# Patient Record
Sex: MALE | Race: WHITE | NOT HISPANIC OR LATINO | Employment: FULL TIME | URBAN - METROPOLITAN AREA
[De-identification: names, ages, dates, MRNs, and addresses within clinical notes are randomized per-mention and may not be internally consistent; named-entity substitution may affect disease eponyms.]

---

## 2017-02-22 RX ORDER — SILDENAFIL CITRATE 100 MG/1
TABLET, FILM COATED ORAL
Qty: 10 TABLET | Refills: 0 | Status: SHIPPED | OUTPATIENT
Start: 2017-02-22 | End: 2017-05-02 | Stop reason: SDUPTHER

## 2017-05-02 RX ORDER — SILDENAFIL CITRATE 100 MG/1
TABLET, FILM COATED ORAL
Qty: 4 TABLET | Refills: 0 | Status: SHIPPED | OUTPATIENT
Start: 2017-05-02 | End: 2017-06-12 | Stop reason: SDUPTHER

## 2017-06-12 DIAGNOSIS — F98.8 ADD (ATTENTION DEFICIT DISORDER): ICD-10-CM

## 2017-06-12 RX ORDER — SILDENAFIL CITRATE 100 MG/1
TABLET, FILM COATED ORAL
Qty: 4 TABLET | Refills: 0 | Status: SHIPPED | OUTPATIENT
Start: 2017-06-12 | End: 2017-09-10 | Stop reason: SDUPTHER

## 2017-06-13 RX ORDER — DEXMETHYLPHENIDATE HYDROCHLORIDE 10 MG/1
10 TABLET ORAL 3 TIMES DAILY
Qty: 90 TABLET | Refills: 0 | OUTPATIENT
Start: 2017-06-13 | End: 2017-07-13

## 2017-06-13 NOTE — TELEPHONE ENCOUNTER
----- Message from Lisa Crawford sent at 6/13/2017  1:33 PM CDT -----  Contact: self  Pt needs a refill on dexmethylphenidate (FOCALIN) 10 MG tablet.  He uses [facility] on [address].      Pt can be reached at [phone]

## 2017-09-11 RX ORDER — SILDENAFIL CITRATE 100 MG/1
TABLET, FILM COATED ORAL
Qty: 10 TABLET | Refills: 0 | Status: SHIPPED | OUTPATIENT
Start: 2017-09-11 | End: 2017-12-09 | Stop reason: SDUPTHER

## 2017-10-04 DIAGNOSIS — F98.8 ATTENTION DEFICIT DISORDER, UNSPECIFIED HYPERACTIVITY PRESENCE: ICD-10-CM

## 2017-10-04 RX ORDER — DEXMETHYLPHENIDATE HYDROCHLORIDE 10 MG/1
10 TABLET ORAL 3 TIMES DAILY
Qty: 90 TABLET | Refills: 0 | OUTPATIENT
Start: 2017-10-04 | End: 2017-11-03

## 2017-10-04 NOTE — TELEPHONE ENCOUNTER
----- Message from Bev Astorga sent at 10/4/2017 11:59 AM CDT -----  Contact: Tana Casper (Spouse)  Tana would like to speak with a nurse regarding a sooner appt that has been offered to her  via text.  Please contact Tana back as soon as possible with appt date and time that is being offered.    Tana can be reached back at [phone]    Thank you

## 2017-10-17 ENCOUNTER — OFFICE VISIT (OUTPATIENT)
Dept: INTERNAL MEDICINE | Facility: CLINIC | Age: 42
End: 2017-10-17
Payer: COMMERCIAL

## 2017-10-17 VITALS
DIASTOLIC BLOOD PRESSURE: 88 MMHG | TEMPERATURE: 99 F | SYSTOLIC BLOOD PRESSURE: 142 MMHG | HEART RATE: 86 BPM | WEIGHT: 221.13 LBS | BODY MASS INDEX: 32.75 KG/M2 | HEIGHT: 69 IN | RESPIRATION RATE: 17 BRPM

## 2017-10-17 DIAGNOSIS — F98.8 ATTENTION DEFICIT DISORDER, UNSPECIFIED HYPERACTIVITY PRESENCE: Primary | ICD-10-CM

## 2017-10-17 DIAGNOSIS — R03.0 ELEVATED BLOOD PRESSURE READING: ICD-10-CM

## 2017-10-17 LAB
AMPHET+METHAMPHET UR QL: NEGATIVE
BARBITURATES UR QL SCN>200 NG/ML: NEGATIVE
BENZODIAZ UR QL SCN>200 NG/ML: NORMAL
BZE UR QL SCN: NEGATIVE
CANNABINOIDS UR QL SCN: NEGATIVE
CREAT UR-MCNC: 139 MG/DL
ETHANOL UR-MCNC: <10 MG/DL
METHADONE UR QL SCN>300 NG/ML: NEGATIVE
OPIATES UR QL SCN: NORMAL
PCP UR QL SCN>25 NG/ML: NEGATIVE
TOXICOLOGY INFORMATION: NORMAL

## 2017-10-17 PROCEDURE — 99999 PR PBB SHADOW E&M-EST. PATIENT-LVL III: CPT | Mod: PBBFAC,,, | Performed by: INTERNAL MEDICINE

## 2017-10-17 PROCEDURE — 80307 DRUG TEST PRSMV CHEM ANLYZR: CPT

## 2017-10-17 PROCEDURE — 99213 OFFICE O/P EST LOW 20 MIN: CPT | Mod: S$GLB,,, | Performed by: INTERNAL MEDICINE

## 2017-10-17 RX ORDER — HYDROCODONE BITARTRATE AND ACETAMINOPHEN 10; 325 MG/1; MG/1
1 TABLET ORAL DAILY PRN
COMMUNITY
Start: 2017-10-10 | End: 2022-10-27 | Stop reason: CLARIF

## 2017-10-17 RX ORDER — DEXMETHYLPHENIDATE HYDROCHLORIDE 10 MG/1
10 TABLET ORAL 2 TIMES DAILY
Qty: 60 TABLET | Refills: 0 | Status: SHIPPED | OUTPATIENT
Start: 2017-11-17 | End: 2017-12-19 | Stop reason: SDUPTHER

## 2017-10-17 RX ORDER — DEXMETHYLPHENIDATE HYDROCHLORIDE 10 MG/1
10 TABLET ORAL 2 TIMES DAILY
Qty: 60 TABLET | Refills: 0 | Status: SHIPPED | OUTPATIENT
Start: 2017-10-17 | End: 2017-11-16

## 2017-10-17 NOTE — PROGRESS NOTES
"Subjective:       Patient ID: Ernesto Casper is a 42 y.o. male.    Chief Complaint: Medication Refill (focalin refill)    HPI   ADD - previously seen by psychiatrist. Notes were sent to Dr. Diggs previously. Dr. Diggs retired. Notes not in system but I had spoken w/ Dr. Diggs previously and confirmed that he was on focolin supposed to be on 10mg TID. Previously controlled on focalin.   Drinks about 3 five hour energy a day w/o focalin.     Lost insurance so lost to follow up for a yr.   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 Finds himself redoing the same tasks so things are taking twice as long to finish and also has to recheck himself regularly.     Recently hurt his back - was on norco prn (rare). Follows w/ outside physician. Plan for MRI. Reports has chronic L4-L5 issues. H/o JACKIE in the past.   Has slight pain still.   Some anxiety at work.   No palpitations/SOB/CP.     Has been exercising regularly. Has lost weight intentionally - went from 38 in waist to 32.   Rare alcohol use.     Review of Systems  as above in HPI.     Objective:      Physical Exam    BP (!) 142/88   Pulse 86   Temp 98.6 °F (37 °C) (Oral)   Resp 17   Ht 5' 9" (1.753 m)   Wt 100.3 kg (221 lb 1.9 oz)   BMI 32.65 kg/m²     GEN - A+OX4, NAD   HEENT - PERRL, EOMI, OP clear. MMM.   Neck - No thyromegaly or cervical LAD. No thyroid masses felt.  CV - RRR, no m/r   Chest - CTAB, no wheezing or rhonchi  Abd - S/NT/ND/+BS.   Ext - 2+B radial pulses. No LE edema.   Neuro - normal gait. 2+ B dtrs.  Skin - No rash.      Assessment/Plan     Ernesto was seen today for medication refill.    Diagnoses and all orders for this visit:    Attention deficit disorder, unspecified hyperactivity presence  -     dexmethylphenidate (FOCALIN) 10 MG tablet; Take 1 tablet (10 mg total) by mouth 2 (two) times daily.  -     dexmethylphenidate (FOCALIN) 10 MG tablet; Take 1 tablet (10 mg total) by mouth 2 " (two) times daily.  -     TOXICOLOGY SCREEN, URINE, RANDOM (COMPLIANCE)    Elevated blood pressure reading  Pt to monitor BP at home daily. Will restart focalin 10mg BID first. Reassess BP at the next visit.     Return in about 2 months (around 12/17/2017).      Chely Tran MD  Department of Internal Medicine - [facility]  4:00 PM

## 2017-12-11 RX ORDER — SILDENAFIL CITRATE 100 MG/1
TABLET, FILM COATED ORAL
Qty: 10 TABLET | Refills: 0 | Status: SHIPPED | OUTPATIENT
Start: 2017-12-11 | End: 2017-12-19

## 2017-12-19 ENCOUNTER — OFFICE VISIT (OUTPATIENT)
Dept: INTERNAL MEDICINE | Facility: CLINIC | Age: 42
End: 2017-12-19
Payer: COMMERCIAL

## 2017-12-19 ENCOUNTER — PATIENT MESSAGE (OUTPATIENT)
Dept: INTERNAL MEDICINE | Facility: CLINIC | Age: 42
End: 2017-12-19

## 2017-12-19 VITALS
BODY MASS INDEX: 32.26 KG/M2 | WEIGHT: 217.81 LBS | DIASTOLIC BLOOD PRESSURE: 82 MMHG | HEART RATE: 76 BPM | SYSTOLIC BLOOD PRESSURE: 138 MMHG | TEMPERATURE: 98 F | RESPIRATION RATE: 16 BRPM | HEIGHT: 69 IN

## 2017-12-19 DIAGNOSIS — F98.8 ATTENTION DEFICIT DISORDER, UNSPECIFIED HYPERACTIVITY PRESENCE: Primary | ICD-10-CM

## 2017-12-19 DIAGNOSIS — N52.9 ERECTILE DYSFUNCTION, UNSPECIFIED ERECTILE DYSFUNCTION TYPE: ICD-10-CM

## 2017-12-19 DIAGNOSIS — F98.8 ATTENTION DEFICIT DISORDER, UNSPECIFIED HYPERACTIVITY PRESENCE: ICD-10-CM

## 2017-12-19 PROCEDURE — 99213 OFFICE O/P EST LOW 20 MIN: CPT | Mod: S$GLB,,, | Performed by: INTERNAL MEDICINE

## 2017-12-19 PROCEDURE — 99999 PR PBB SHADOW E&M-EST. PATIENT-LVL III: CPT | Mod: PBBFAC,,, | Performed by: INTERNAL MEDICINE

## 2017-12-19 RX ORDER — DEXMETHYLPHENIDATE HYDROCHLORIDE 10 MG/1
10 TABLET ORAL 2 TIMES DAILY
Qty: 60 TABLET | Refills: 0 | Status: CANCELLED | OUTPATIENT
Start: 2017-12-19 | End: 2018-01-18

## 2017-12-19 RX ORDER — DEXMETHYLPHENIDATE HYDROCHLORIDE 10 MG/1
10 TABLET ORAL 3 TIMES DAILY
Qty: 90 TABLET | Refills: 0 | Status: SHIPPED | OUTPATIENT
Start: 2018-01-19 | End: 2018-02-18

## 2017-12-19 RX ORDER — DEXMETHYLPHENIDATE HYDROCHLORIDE 10 MG/1
10 TABLET ORAL 3 TIMES DAILY
Qty: 90 TABLET | Refills: 0 | Status: SHIPPED | OUTPATIENT
Start: 2017-12-19 | End: 2018-01-18

## 2017-12-19 RX ORDER — SILDENAFIL 100 MG/1
100 TABLET, FILM COATED ORAL DAILY PRN
Qty: 10 TABLET | Refills: 3 | Status: SHIPPED | OUTPATIENT
Start: 2017-12-19 | End: 2018-07-10 | Stop reason: SDUPTHER

## 2017-12-19 RX ORDER — DEXMETHYLPHENIDATE HYDROCHLORIDE 10 MG/1
10 TABLET ORAL 3 TIMES DAILY
Qty: 90 TABLET | Refills: 0 | Status: SHIPPED | OUTPATIENT
Start: 2018-02-19 | End: 2018-03-21 | Stop reason: SDUPTHER

## 2017-12-19 NOTE — PROGRESS NOTES
"Subjective:       Patient ID: Ernesto Casper is a 42 y.o. male.    Chief Complaint: ADD f/u    HPI   ADD - previously seen by psychiatrist. Notes were sent to Dr. Diggs previously. Dr. Diggs retired. Notes not in system but I had spoken w/ Dr. Diggs previously and confirmed that he was on focolin supposed to be on 10mg TID. Previously controlled on focalin.   Drinks about 3 five hour energy a day w/o focalin.   At last visit 10/17/17, restarted on focalin 10mg BID instead of TID as he was off focalin for a while. Reports that this is an improvement but med runs out towards the end of the day - day starts at 6am and ends at 8pm at night so long days.    Has a BP monitor at home. Variable at home. SBP<140. Increased cardio.   Weight loss of about 4lbs. Works out regularly. Low body fat. Chronic insomnia. Not worse w/ focalin.     ED - viagra. Needs generic.     Review of Systems  Comprehensive review of systems otherwise negative. See history/subjective section for more details.    Objective:      Physical Exam    /82   Pulse 76   Temp 97.9 °F (36.6 °C) (Oral)   Resp 16   Ht 5' 9" (1.753 m)   Wt 98.8 kg (217 lb 13 oz)   BMI 32.17 kg/m²     GEN - A+OX4, NAD   HEENT - PERRL, EOMI, OP clear. MMM.   Neck - No thyromegaly or cervical LAD. No thyroid masses felt.  CV - RRR, no m/r   Chest - CTAB, no wheezing or rhonchi  Abd - S/NT/ND/+BS.   Ext - 2+B radial pulses. No LE edema.   Neuro - normal gait. 2+ B dtrs.  Skin - No rash.    Labs reviewed from 2016. Reports that he had all his labs done w/ life insurance. Will bring a copy.     Assessment/Plan     Ernesto was seen today for erectile dysfunction, gastroesophageal reflux and add.    Diagnoses and all orders for this visit:    Attention deficit disorder, unspecified hyperactivity presence  -     dexmethylphenidate (FOCALIN) 10 MG tablet; Take 1 tablet (10 mg total) by mouth 3 (three) times daily.  -     dexmethylphenidate (FOCALIN) 10 MG tablet; Take " 1 tablet (10 mg total) by mouth 3 (three) times daily.  -     dexmethylphenidate (FOCALIN) 10 MG tablet; Take 1 tablet (10 mg total) by mouth 3 (three) times daily.    Erectile dysfunction, unspecified erectile dysfunction type  -     sildenafil (VIAGRA) 100 MG tablet; Take 1 tablet (100 mg total) by mouth daily as needed for Erectile Dysfunction.    declines flu vaccine.     Return in about 3 months (around 3/19/2018).      Chely Tran MD  Department of Internal Medicine - [facility]  11:42 AM

## 2018-03-21 ENCOUNTER — OFFICE VISIT (OUTPATIENT)
Dept: INTERNAL MEDICINE | Facility: CLINIC | Age: 43
End: 2018-03-21
Payer: COMMERCIAL

## 2018-03-21 VITALS
HEART RATE: 78 BPM | WEIGHT: 220.13 LBS | TEMPERATURE: 99 F | BODY MASS INDEX: 32.6 KG/M2 | HEIGHT: 69 IN | RESPIRATION RATE: 15 BRPM | DIASTOLIC BLOOD PRESSURE: 95 MMHG | SYSTOLIC BLOOD PRESSURE: 145 MMHG

## 2018-03-21 DIAGNOSIS — R03.0 ELEVATED BLOOD PRESSURE READING: ICD-10-CM

## 2018-03-21 DIAGNOSIS — F98.8 ATTENTION DEFICIT DISORDER, UNSPECIFIED HYPERACTIVITY PRESENCE: Primary | ICD-10-CM

## 2018-03-21 DIAGNOSIS — S46.219A BICEPS TENDON TEAR: ICD-10-CM

## 2018-03-21 DIAGNOSIS — J01.90 ACUTE SINUSITIS, RECURRENCE NOT SPECIFIED, UNSPECIFIED LOCATION: ICD-10-CM

## 2018-03-21 PROCEDURE — 99999 PR PBB SHADOW E&M-EST. PATIENT-LVL IV: CPT | Mod: PBBFAC,,, | Performed by: INTERNAL MEDICINE

## 2018-03-21 PROCEDURE — 99214 OFFICE O/P EST MOD 30 MIN: CPT | Mod: S$GLB,,, | Performed by: INTERNAL MEDICINE

## 2018-03-21 RX ORDER — DEXMETHYLPHENIDATE HYDROCHLORIDE 10 MG/1
10 TABLET ORAL 3 TIMES DAILY
Qty: 90 TABLET | Refills: 0 | Status: SHIPPED | OUTPATIENT
Start: 2018-04-21 | End: 2018-05-21

## 2018-03-21 RX ORDER — DEXMETHYLPHENIDATE HYDROCHLORIDE 10 MG/1
10 TABLET ORAL 3 TIMES DAILY
Qty: 90 TABLET | Refills: 0 | Status: SHIPPED | OUTPATIENT
Start: 2018-05-22 | End: 2018-06-14 | Stop reason: SDUPTHER

## 2018-03-21 RX ORDER — DEXMETHYLPHENIDATE HYDROCHLORIDE 10 MG/1
10 TABLET ORAL 3 TIMES DAILY
Qty: 90 TABLET | Refills: 0 | Status: SHIPPED | OUTPATIENT
Start: 2018-03-21 | End: 2018-04-20

## 2018-03-21 NOTE — PROGRESS NOTES
"Subjective:       Patient ID: Ernesto Casper is a 42 y.o. male.    Chief Complaint: Medication Refill - ADD    HPI   ADD - previously seen by psychiatrist. Notes from his previous psychiatrist were sent to Dr. Diggs previously. Dr. Diggs retired. Notes not in system but I had spoken w/ Dr. Diggs previously and confirmed that he was on focolin supposed to be on 10mg TID. Previously controlled on focalin.   Drinks about 3 five hour energy a day w/o focalin.   At visit 10/17/17, restarted on focalin 10mg BID instead of TID as he was off focalin for a while. Reports that this is an improvement but medication effect runs out towards the end of the day - work day starts at 6am and ends at 8pm at night so long days.  UTox - positive for opiate and neg for amphetamine.   appropriate. Pt is also following w/ Dr. Kehinde Woods w/ pain mgmt in [place] for a while (per [employer]!) for norco and ambien.   No palpitations/unexpected weight loss/CP. This dosage is still controlling his ADD.    Today pt c/o postnasal drip yesterday morning. 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 Started coughing and sneezing. Chills. Fatigue. Highest temp 99.     Torn L biceps tendon a few mo ago. Follows w/ ortho. Had cortisone injection recently. Pain in the L biceps when lifting anything heavy.     Review of Systems  as above in HPI.     Objective:      Physical Exam    BP (!) 152/98   Pulse 78   Temp 99.2 °F (37.3 °C) (Oral)   Resp 15   Ht 5' 9" (1.753 m)   Wt 99.9 kg (220 lb 2.1 oz)   BMI 32.51 kg/m²     Gen - A+Ox4, NAD  HEENT - PERRL, OP with mild erythema but no exudates. MMM. TM normal. B maxillary mild sinus tenderness to palpation.  Neck - No cervical LAD.  CV - RRR  Chest -  CTAB. No wheezing with normal work of breathing.   Abd - S/NT/ND/+BS  Ext - 2+ BDP and radial pulses. No LE edema.  MSK - LUE "shell" deformity w/ pain on palpation.     Previous labs reviewed.    Assessment/Plan     Ernesto was seen today " for medication refill, gastroesophageal reflux, erectile dysfunction and add.    Diagnoses and all orders for this visit:    Attention deficit disorder, unspecified hyperactivity presence  -     dexmethylphenidate (FOCALIN) 10 MG tablet; Take 1 tablet (10 mg total) by mouth 3 (three) times daily.  -     dexmethylphenidate (FOCALIN) 10 MG tablet; Take 1 tablet (10 mg total) by mouth 3 (three) times daily.  -     dexmethylphenidate (FOCALIN) 10 MG tablet; Take 1 tablet (10 mg total) by mouth 3 (three) times daily.    Elevated blood pressure reading - last BP ok. Given his pain in the L biceps and acute sinusitis currently, BP acceptable. Discussed w/ pt to check BP at home. If continued elevation, may have to adjust focalin.     Biceps tendon tear - f/u w/ ortho.     Acute sinusitis, recurrence not specified, unspecified location - symptomatic treatment. Rest and plenty of fluids. flonase and mucinex (not DM) OTC.       Follow-up in about 3 months (around 6/21/2018).      Chely Tran MD  Department of Internal Medicine - [facility]  7:35 AM

## 2018-06-14 ENCOUNTER — OFFICE VISIT (OUTPATIENT)
Dept: INTERNAL MEDICINE | Facility: CLINIC | Age: 43
End: 2018-06-14
Payer: COMMERCIAL

## 2018-06-14 VITALS
BODY MASS INDEX: 34.17 KG/M2 | TEMPERATURE: 98 F | DIASTOLIC BLOOD PRESSURE: 90 MMHG | SYSTOLIC BLOOD PRESSURE: 170 MMHG | HEART RATE: 90 BPM | HEIGHT: 68 IN | WEIGHT: 225.5 LBS

## 2018-06-14 DIAGNOSIS — R03.0 ELEVATED BLOOD PRESSURE READING: ICD-10-CM

## 2018-06-14 DIAGNOSIS — F98.8 ATTENTION DEFICIT DISORDER, UNSPECIFIED HYPERACTIVITY PRESENCE: Primary | ICD-10-CM

## 2018-06-14 DIAGNOSIS — J01.00 ACUTE MAXILLARY SINUSITIS, RECURRENCE NOT SPECIFIED: ICD-10-CM

## 2018-06-14 PROCEDURE — 99999 PR PBB SHADOW E&M-EST. PATIENT-LVL IV: CPT | Mod: PBBFAC,,, | Performed by: INTERNAL MEDICINE

## 2018-06-14 PROCEDURE — 99213 OFFICE O/P EST LOW 20 MIN: CPT | Mod: S$GLB,,, | Performed by: INTERNAL MEDICINE

## 2018-06-14 PROCEDURE — 3008F BODY MASS INDEX DOCD: CPT | Mod: CPTII,S$GLB,, | Performed by: INTERNAL MEDICINE

## 2018-06-14 RX ORDER — AMOXICILLIN AND CLAVULANATE POTASSIUM 875; 125 MG/1; MG/1
1 TABLET, FILM COATED ORAL EVERY 12 HOURS
Qty: 20 TABLET | Refills: 0 | Status: SHIPPED | OUTPATIENT
Start: 2018-06-14 | End: 2018-06-24

## 2018-06-14 RX ORDER — DEXMETHYLPHENIDATE HYDROCHLORIDE 10 MG/1
10 TABLET ORAL 3 TIMES DAILY
Qty: 90 TABLET | Refills: 0 | Status: SHIPPED | OUTPATIENT
Start: 2018-07-15 | End: 2018-08-14

## 2018-06-14 RX ORDER — DEXMETHYLPHENIDATE HYDROCHLORIDE 10 MG/1
10 TABLET ORAL 3 TIMES DAILY
Qty: 90 TABLET | Refills: 0 | Status: SHIPPED | OUTPATIENT
Start: 2018-09-15 | End: 2018-08-20

## 2018-06-14 RX ORDER — AMOXICILLIN AND CLAVULANATE POTASSIUM 875; 125 MG/1; MG/1
1 TABLET, FILM COATED ORAL EVERY 12 HOURS
Qty: 20 TABLET | Refills: 0 | Status: SHIPPED | OUTPATIENT
Start: 2018-06-14 | End: 2018-06-14 | Stop reason: SDUPTHER

## 2018-06-14 RX ORDER — GABAPENTIN 300 MG/1
300 CAPSULE ORAL 3 TIMES DAILY
Refills: 5 | COMMUNITY
Start: 2018-05-21

## 2018-06-14 RX ORDER — DEXMETHYLPHENIDATE HYDROCHLORIDE 10 MG/1
10 TABLET ORAL 3 TIMES DAILY
Qty: 90 TABLET | Refills: 0 | Status: SHIPPED | OUTPATIENT
Start: 2018-06-14 | End: 2018-07-14

## 2018-06-14 RX ORDER — DEXMETHYLPHENIDATE HYDROCHLORIDE 10 MG/1
10 TABLET ORAL 3 TIMES DAILY
Qty: 90 TABLET | Refills: 0 | Status: SHIPPED | OUTPATIENT
Start: 2018-08-15 | End: 2018-10-22 | Stop reason: SDUPTHER

## 2018-06-14 NOTE — PROGRESS NOTES
"Subjective:       Patient ID: Ernesto Casper is a 43 y.o. male.    Chief Complaint: Medication Refill and Otalgia (2/3 days)    HPI   ADD - previously seen by psychiatrist. Notes from his previous psychiatrist were sent to Dr. Diggs previously. Dr. Diggs retired. Notes not in system but I had spoken w/ Dr. Diggs previously and confirmed that he was on focolin supposed to be on 10mg TID. Previously controlled on focalin.   Drinks about 3 five hour energy a day w/o focalin.   At visit 10/17/17, restarted on focalin 10mg BID instead of TID as he was off focalin for a while. Reports that this is an improvement but medication effect runs out towards the end of the day - work day starts at 6am and ends at 8pm at night so long days.  UTox - positive for opiate and neg for amphetamine.   appropriate. Pt is also following w/ Dr. Kehinde Woods w/ pain mgmt in [place] for a while (per [employer]) for norco and ambien.   No palpitations/unexpected weight loss/CP. This dosage is still controlling his ADD.    C/o fluid in the L ear x 1 mo (since he last flew on airplane). Sinus congestion and pressure for the last few days. No fevers/chills. Scratchy throat, losing voice.   Took sudafed this morning. This helped a little.   Going onto a plane right after this visit and will be having meeting after meetings. Flying back on Saturday.    Review of Systems  as above in HPI.     Objective:      Physical Exam    BP (!) 170/90   Pulse 90   Temp 98.3 °F (36.8 °C)   Ht 5' 8" (1.727 m)   Wt 102.3 kg (225 lb 8.5 oz)   BMI 34.29 kg/m²     GEN - A+OX4, NAD   HEENT - PERRL, EOMI, OP mildly tender to palpation. L TM dullness w/ cerumen blocking most of the view. R TM normal. B maxillary sinuses exquisitely tender to palpation. Frontal sinus pain also.   Neck - No thyromegaly or cervical LAD. No thyroid masses felt.  CV - RRR, no m/r   Chest - CTAB, no wheezing or rhonchi  Abd - S/NT/ND/+BS.   Ext - 2+BDP and radial pulses. No " LE edema.  Skin - No rash.    Assessment/Plan     Ernesto was seen today for medication refill and otalgia.    Diagnoses and all orders for this visit:    Attention deficit disorder, unspecified hyperactivity presence  -     dexmethylphenidate (FOCALIN) 10 MG tablet; Take 1 tablet (10 mg total) by mouth 3 (three) times daily.  -     dexmethylphenidate (FOCALIN) 10 MG tablet; Take 1 tablet (10 mg total) by mouth 3 (three) times daily.  -     dexmethylphenidate (FOCALIN) 10 MG tablet; Take 1 tablet (10 mg total) by mouth 3 (three) times daily.  -     dexmethylphenidate (FOCALIN) 10 MG tablet; Take 1 tablet (10 mg total) by mouth 3 (three) times daily.    Acute maxillary sinusitis, recurrence not specified - pt is flying out today right after clinic. Will go ahead and order augmentin to take prn.     Elevated blood pressure reading - likely due to sudafed. Recommend coricidin prn instead. Can use flonase prn.  Return to clinic for blood pressure check in 4 weeks w/ nurse.    Other orders  -     amoxicillin-clavulanate 875-125mg (AUGMENTIN) 875-125 mg per tablet; Take 1 tablet by mouth every 12 (twelve) hours.      Follow-up in about 4 months (around 10/14/2018).      Chely Tran MD  Department of Internal Medicine - [facility]  10:59 AM

## 2018-07-10 DIAGNOSIS — N52.9 ERECTILE DYSFUNCTION, UNSPECIFIED ERECTILE DYSFUNCTION TYPE: ICD-10-CM

## 2018-07-10 RX ORDER — SILDENAFIL 100 MG/1
100 TABLET, FILM COATED ORAL DAILY PRN
Qty: 10 TABLET | Refills: 2 | Status: SHIPPED | OUTPATIENT
Start: 2018-07-10 | End: 2018-07-13 | Stop reason: SDUPTHER

## 2018-07-13 DIAGNOSIS — N52.9 ERECTILE DYSFUNCTION, UNSPECIFIED ERECTILE DYSFUNCTION TYPE: ICD-10-CM

## 2018-07-16 RX ORDER — SILDENAFIL 100 MG/1
100 TABLET, FILM COATED ORAL DAILY PRN
Qty: 10 TABLET | Refills: 3 | Status: SHIPPED | OUTPATIENT
Start: 2018-07-16 | End: 2018-10-22 | Stop reason: SDUPTHER

## 2018-08-20 ENCOUNTER — OFFICE VISIT (OUTPATIENT)
Dept: URGENT CARE | Facility: CLINIC | Age: 43
End: 2018-08-20
Payer: COMMERCIAL

## 2018-08-20 ENCOUNTER — HOSPITAL ENCOUNTER (OUTPATIENT)
Dept: RADIOLOGY | Facility: HOSPITAL | Age: 43
Discharge: HOME | End: 2018-08-20
Attending: NURSE PRACTITIONER
Payer: COMMERCIAL

## 2018-08-20 ENCOUNTER — TELEPHONE (OUTPATIENT)
Dept: URGENT CARE | Facility: CLINIC | Age: 43
End: 2018-08-20

## 2018-08-20 VITALS
WEIGHT: 225 LBS | HEART RATE: 92 BPM | TEMPERATURE: 98 F | OXYGEN SATURATION: 100 % | HEIGHT: 68 IN | DIASTOLIC BLOOD PRESSURE: 96 MMHG | SYSTOLIC BLOOD PRESSURE: 144 MMHG | BODY MASS INDEX: 34.1 KG/M2

## 2018-08-20 DIAGNOSIS — R10.9 ABDOMINAL PAIN, UNSPECIFIED ABDOMINAL LOCATION: Primary | ICD-10-CM

## 2018-08-20 DIAGNOSIS — R10.9 ABDOMINAL PAIN, UNSPECIFIED ABDOMINAL LOCATION: ICD-10-CM

## 2018-08-20 LAB
BILIRUB UR QL STRIP: NEGATIVE
GLUCOSE UR QL STRIP: NEGATIVE
KETONES UR QL STRIP: NEGATIVE
LEUKOCYTE ESTERASE UR QL STRIP: NEGATIVE
PH, POC UA: 6.5 (ref 5–8)
POC BLOOD, URINE: NEGATIVE
POC NITRATES, URINE: NEGATIVE
PROT UR QL STRIP: POSITIVE
SP GR UR STRIP: 1.01 (ref 1–1.03)
UROBILINOGEN UR STRIP-ACNC: NORMAL (ref 0.3–2.2)

## 2018-08-20 PROCEDURE — 81003 URINALYSIS AUTO W/O SCOPE: CPT | Mod: QW,S$GLB,, | Performed by: NURSE PRACTITIONER

## 2018-08-20 PROCEDURE — 3008F BODY MASS INDEX DOCD: CPT | Mod: CPTII,S$GLB,, | Performed by: NURSE PRACTITIONER

## 2018-08-20 PROCEDURE — 76705 ECHO EXAM OF ABDOMEN: CPT | Mod: TC,PO

## 2018-08-20 PROCEDURE — 76705 ECHO EXAM OF ABDOMEN: CPT | Mod: 26,,, | Performed by: RADIOLOGY

## 2018-08-20 PROCEDURE — 99214 OFFICE O/P EST MOD 30 MIN: CPT | Mod: 25,S$GLB,, | Performed by: NURSE PRACTITIONER

## 2018-08-20 RX ORDER — ONDANSETRON 4 MG/1
4 TABLET, FILM COATED ORAL EVERY 6 HOURS PRN
Qty: 30 TABLET | Refills: 1 | Status: SHIPPED | OUTPATIENT
Start: 2018-08-20 | End: 2019-01-08

## 2018-08-20 NOTE — PROGRESS NOTES
"Subjective:       Patient ID: Ernesto Casper is a 43 y.o. male.    Vitals:  height is 5' 8" (1.727 m) and weight is 102.1 kg (225 lb). His oral temperature is 98 °F (36.7 °C). His blood pressure is 144/96 (abnormal) and his pulse is 92. His oxygen saturation is 100%.     Chief Complaint: Abdominal Pain    Started last night at the gym when he was working out, worsened through the night. Last night took laxative and gas x. Pain worsens with movement. He states he has had a BM today and yesterday but small amount. Nausea without vomiting. Has not eaten today. Has had this in the past, felt like stomach virus. 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   Right abd pain generalized radiating to the lower back, denies burning or pain with urination. He has his own clinical lab. He states all his labs were good. Denies pain radiating to the testicles.   Yesterday had jambalaya and pizza which is not normal for him. Normally he eats very clean, protein etc he states they were left overs. He states the pain is much better than last night.       Abdominal Pain   This is a new problem. The current episode started yesterday. The pain is located in the generalized abdominal region. The quality of the pain is aching and cramping. Associated symptoms include constipation, diarrhea and nausea. Pertinent negatives include no dysuria, fever, frequency, headaches, hematuria or vomiting. Flatus: burping a lot more. The pain is relieved by nothing. He has tried antacids for the symptoms. The treatment provided no relief.     Review of Systems   Constitution: Negative for chills and fever.   Eyes: Negative for discharge.   Skin: Negative for flushing and rash.   Musculoskeletal: Negative for back pain.   Gastrointestinal: Positive for bloating, abdominal pain, constipation, diarrhea and nausea. Negative for vomiting. Flatus: burping a lot more.   Genitourinary: Negative for dysuria, frequency, genital sores, " hematuria and urgency.   Neurological: Negative for headaches.       Objective:      Physical Exam   Constitutional: He is oriented to person, place, and time. He appears well-developed and well-nourished.   HENT:   Head: Normocephalic and atraumatic.   Right Ear: External ear normal.   Left Ear: External ear normal.   Nose: Nose normal.   Mouth/Throat: Mucous membranes are normal.   Eyes: Conjunctivae and lids are normal.   Neck: Trachea normal, normal range of motion and full passive range of motion without pain. Neck supple.   Cardiovascular: Normal rate, regular rhythm and normal heart sounds.   Pulmonary/Chest: Effort normal and breath sounds normal. No respiratory distress.   Abdominal: Soft. Normal appearance and bowel sounds are normal. He exhibits no distension, no abdominal bruit, no pulsatile midline mass and no mass. There is generalized tenderness and tenderness in the right lower quadrant. There is no rebound, no guarding and no CVA tenderness.   Musculoskeletal: Normal range of motion. He exhibits no edema.   Neurological: He is alert and oriented to person, place, and time. He has normal strength.   Skin: Skin is warm, dry and intact. He is not diaphoretic. No pallor.   Psychiatric: He has a normal mood and affect. His speech is normal and behavior is normal. Judgment and thought content normal. Cognition and memory are normal.   Nursing note and vitals reviewed.      Assessment:       1. Abdominal pain, unspecified abdominal location        Plan:         Abdominal pain, unspecified abdominal location  -     POCT Urinalysis, Dipstick, Automated, W/O Scope  -     US Abdomen Limited; Future; Expected date: 08/20/2018    Other orders  -     ondansetron (ZOFRAN) 4 MG tablet; Take 1 tablet (4 mg total) by mouth every 6 (six) hours as needed for Nausea.  Dispense: 30 tablet; Refill: 1    patient worried about appendix and wanted to rule this out. Set up outpatient US for today at 3:15   UA negative for  blood, nit, ap. Does have protein in the urine.   Lab Results   Component Value Date    CREATININE 1.2 01/30/2015    BUN 24 (H) 01/30/2015     01/30/2015    K 4.1 01/30/2015     01/30/2015    CO2 26 01/30/2015       Patient Instructions      We will call you with results of your US   IF you have severe pain, nausea vomiting and cant keep anything down, worsening symptoms go to the ER      Follow with your PCP for your Blood pressure!    Please follow up with your Primary care provider within 2-5 days if your signs and symptoms have not resolved or worsen.     If your condition worsens or fails to improve we recommend that you receive another evaluation at the emergency room immediately or contact your primary medical clinic to discuss your concerns.   You must understand that you have received an Urgent Care treatment only and that you may be released before all of your medical problems are known or treated. You, the patient, will arrange for follow up care as instructed.         Anatomy of the Abdomen and Groin  The abdomen is the largest space (cavity) in the body. It lies between the chest and the pelvis, holding many of the bodys organs. These include the liver, stomach, and intestines. The groin is the area in the body where the upper thighs meet the lowest part of the abdomen. Normally, the abdomen and groin are kept separate by a wall of muscle and tissue. The only openings in the wall are small tunnels called the inguinal and femoral canals. These allow nerves, blood vessels, and other structures to pass between these two areas.              The abdomen and groin  · Muscles. These are soft tissues. They enclose the intestines and other organs in the abdomen.  · Connective tissue. These help bind the muscles together.  · Inguinal canal. This is a tunnel in the groin. It is formed by layers of muscle and other tissues in the wall of the abdomen.  · Femoral canal. This is a tunnel in the wall of  the abdomen. It allows blood vessels and nerves to pass through the groin into the leg.  · Spermatic cord. This passes through the inguinal canal and connects to the testicle.  Date Last Reviewed: 10/1/2016  © 5006-6194 [employer]. [address] [place] [address]. All rights reserved. This information is not intended as a substitute for professional medical care. Always follow your healthcare professional's instructions.

## 2018-08-20 NOTE — PATIENT INSTRUCTIONS
We will call you with results of your US   IF you have severe pain, nausea vomiting and cant keep anything down, worsening symptoms go to the ER      Follow with your PCP for your Blood pressure!    Please follow up with your Primary care provider within 2-5 days if your signs and symptoms have not resolved or worsen.     If your condition worsens or fails to improve we recommend that you receive another evaluation at the emergency room immediately or contact your primary medical clinic to discuss your concerns.   You must understand that you have received an Urgent Care treatment only and that you may be released before all of your medical problems are known or treated. You, the patient, will arrange for follow up care as instructed.         Anatomy of the Abdomen and Groin  The abdomen is the largest space (cavity) in the body. It lies between the chest and the pelvis, holding many of the bodys organs. These include the liver, stomach, and intestines. The groin is the area in the body where the upper thighs meet the lowest part of the abdomen. Normally, the abdomen and groin are kept separate by a wall of muscle and tissue. The only openings in the wall are small tunnels called the inguinal and femoral canals. These allow nerves, blood vessels, and other structures to pass between these two areas.              The abdomen and groin  · Muscles. These are soft tissues. They enclose the intestines and other organs in the abdomen.  · Connective tissue. These help bind the muscles together.  · Inguinal canal. This is a tunnel in the groin. It is formed by layers of muscle and other tissues in the wall of the abdomen.  · Femoral canal. This is a tunnel in the wall of the abdomen. It allows blood vessels and nerves to pass through the groin into the leg.  · Spermatic cord. This passes through the inguinal canal and connects to the testicle.  Date Last Reviewed: 10/1/2016  © 1866-2187 [employer]. [address]  [place] [address]. All rights reserved. This information is not intended as a substitute for professional medical care. Always follow your healthcare professional's instructions.

## 2018-08-20 NOTE — TELEPHONE ENCOUNTER
Discussed with the patient about US results after I was sure the US tech did look at the appendix. She states there was no inflammation in that area which I relayed to the patient. I did inform him there was not a good view of the gallbladder and there was hepatomegaly. He voiced understanding.

## 2018-08-23 ENCOUNTER — TELEPHONE (OUTPATIENT)
Dept: URGENT CARE | Facility: CLINIC | Age: 43
End: 2018-08-23

## 2018-10-22 ENCOUNTER — PATIENT MESSAGE (OUTPATIENT)
Dept: INTERNAL MEDICINE | Facility: CLINIC | Age: 43
End: 2018-10-22

## 2018-10-22 DIAGNOSIS — N52.9 ERECTILE DYSFUNCTION, UNSPECIFIED ERECTILE DYSFUNCTION TYPE: ICD-10-CM

## 2018-10-22 DIAGNOSIS — F98.8 ATTENTION DEFICIT DISORDER, UNSPECIFIED HYPERACTIVITY PRESENCE: ICD-10-CM

## 2018-10-22 RX ORDER — DEXMETHYLPHENIDATE HYDROCHLORIDE 10 MG/1
10 TABLET ORAL 3 TIMES DAILY
Qty: 90 TABLET | Refills: 0 | Status: SHIPPED | OUTPATIENT
Start: 2018-10-22 | End: 2018-10-31 | Stop reason: SDUPTHER

## 2018-10-22 RX ORDER — SILDENAFIL 100 MG/1
100 TABLET, FILM COATED ORAL DAILY PRN
Qty: 10 TABLET | Refills: 3 | Status: SHIPPED | OUTPATIENT
Start: 2018-10-22 | End: 2019-04-23 | Stop reason: SDUPTHER

## 2018-10-22 NOTE — TELEPHONE ENCOUNTER
----- Message from Shruthi Santos sent at 10/22/2018 11:53 AM CDT -----  Contact: Pt's Wife Mrs Casper [phone]  Pt's wife is requesting a call back to see about getting refills for following as he was unable to get an appointment until January:    dexmethylphenidate (FOCALIN) 10 MG tablet 90 tablet 0 8/15/2018 9/14/2018 --     sildenafil (VIAGRA) 100 MG tablet 10 tablet 3 7/16/2018 7/16/2019 No     Pharmacy Contact     Telephone Fax  [phone]    Patient has been added to the wait list for a sooner appointment.    Patient may be reached at [phone]    Thank you.  AUNG

## 2018-10-24 ENCOUNTER — PATIENT MESSAGE (OUTPATIENT)
Dept: INTERNAL MEDICINE | Facility: CLINIC | Age: 43
End: 2018-10-24

## 2018-10-25 NOTE — TELEPHONE ENCOUNTER
Letitia, can you do pre-auth for focalin 3x/day for ADHD? He's been on this regimen for years. BID did not help him to focus as well.

## 2018-10-31 ENCOUNTER — OFFICE VISIT (OUTPATIENT)
Dept: INTERNAL MEDICINE | Facility: CLINIC | Age: 43
End: 2018-10-31
Payer: COMMERCIAL

## 2018-10-31 VITALS
BODY MASS INDEX: 33.78 KG/M2 | SYSTOLIC BLOOD PRESSURE: 118 MMHG | HEART RATE: 62 BPM | TEMPERATURE: 99 F | WEIGHT: 222.88 LBS | DIASTOLIC BLOOD PRESSURE: 78 MMHG | HEIGHT: 68 IN

## 2018-10-31 DIAGNOSIS — F98.8 ATTENTION DEFICIT DISORDER, UNSPECIFIED HYPERACTIVITY PRESENCE: Primary | ICD-10-CM

## 2018-10-31 DIAGNOSIS — F41.9 ANXIETY: ICD-10-CM

## 2018-10-31 DIAGNOSIS — G47.00 INSOMNIA, UNSPECIFIED TYPE: ICD-10-CM

## 2018-10-31 PROCEDURE — 3008F BODY MASS INDEX DOCD: CPT | Mod: CPTII,S$GLB,, | Performed by: INTERNAL MEDICINE

## 2018-10-31 PROCEDURE — 80307 DRUG TEST PRSMV CHEM ANLYZR: CPT

## 2018-10-31 PROCEDURE — 99999 PR PBB SHADOW E&M-EST. PATIENT-LVL IV: CPT | Mod: PBBFAC,,, | Performed by: INTERNAL MEDICINE

## 2018-10-31 PROCEDURE — 99214 OFFICE O/P EST MOD 30 MIN: CPT | Mod: S$GLB,,, | Performed by: INTERNAL MEDICINE

## 2018-10-31 RX ORDER — SERTRALINE HYDROCHLORIDE 50 MG/1
50 TABLET, FILM COATED ORAL DAILY
Refills: 5 | COMMUNITY
Start: 2018-10-10 | End: 2022-03-18 | Stop reason: CLARIF

## 2018-10-31 RX ORDER — DEXMETHYLPHENIDATE HYDROCHLORIDE 10 MG/1
10 TABLET ORAL 3 TIMES DAILY
Qty: 90 TABLET | Refills: 0 | Status: SHIPPED | OUTPATIENT
Start: 2018-11-22 | End: 2018-12-22

## 2018-10-31 RX ORDER — DEXMETHYLPHENIDATE HYDROCHLORIDE 10 MG/1
10 TABLET ORAL 3 TIMES DAILY
Qty: 90 TABLET | Refills: 0 | Status: SHIPPED | OUTPATIENT
Start: 2019-01-23 | End: 2022-03-18 | Stop reason: CLARIF

## 2018-10-31 RX ORDER — ALPRAZOLAM 0.5 MG/1
0.5 TABLET ORAL 2 TIMES DAILY PRN
COMMUNITY
Start: 2018-10-26

## 2018-10-31 RX ORDER — BUSPIRONE HYDROCHLORIDE 7.5 MG/1
7.5 TABLET ORAL 3 TIMES DAILY
Refills: 5 | COMMUNITY
Start: 2018-10-13

## 2018-10-31 RX ORDER — DEXMETHYLPHENIDATE HYDROCHLORIDE 10 MG/1
10 TABLET ORAL 3 TIMES DAILY
Qty: 90 TABLET | Refills: 0 | Status: SHIPPED | OUTPATIENT
Start: 2018-12-23 | End: 2019-01-22

## 2018-10-31 RX ORDER — FLUTICASONE PROPIONATE 50 MCG
SPRAY, SUSPENSION (ML) NASAL
Refills: 5 | COMMUNITY
Start: 2018-10-25

## 2018-10-31 NOTE — PROGRESS NOTES
"Subjective:       Patient ID: Ernesto Casper is a 43 y.o. male.    Chief Complaint: Medication Refill    HPI   ADD - previously seen and dx by psychiatrist. Unable to find previous psychiatrist's note but I had spoken w/ Dr. Diggs, pt's previous PCP who had reviewed psychiatrist's note and recommended pt to be on focalin 10mg TID. Dr. Diggs has unfortunately since retired and passed away.   Pt was seeing me for a while and was taking focalin 10mg TID and then lost insurance. He restarted seeing [facility] 10/2017 when he got insurance back. I had restarted him on focalin 10mg BID instead of TID. He reports improvement w/ BID dosing but medication effect runs out towards the end of the day as he has very long work days. We had went back up to 10mg TID and pt reports this controls his symptoms well. Allows him to do his job effectively.     Of note, pt does follow w/ Dr. Kehinde Woods, a pain mgmt doctor in [place] and is also on norco and ambien. It seems that pt was also started on xanax in addition to the above medicines w/ Dr. Boyle (PCP in [facility]).    Anxiety - on sertraline 50mg daily, buspar TID and also xanax 0.5mg bid prn. Pt reports w/ panic attacks. 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     L ear popping.     Review of Systems  as above in HPI.     Objective:      Physical Exam    /78 (BP Location: Left arm, Patient Position: Sitting, BP Method: Large (Manual))   Pulse 62   Temp 98.8 °F (37.1 °C)   Ht 5' 8" (1.727 m)   Wt 101.1 kg (222 lb 14.4 oz)   BMI 33.89 kg/m²     Gen - A+OX4, NAD, sweaty.  Psych - anxious appearing. Pressured speech.  HEENT - PERRL, OP clear. MMM.   NECK - No LAD  CV - RRR, no m/r  Chest - CTAB, no wheezing/rhonchi  Abd - S/NT/ND/+BS  Ext - 2+ B DP and radial pulses. No LE edema.   MSK - no spinal tenderness to palpation. Normal gait.     Assessment/Plan     Ernesto was seen today for medication refill.    Diagnoses and all orders for this " visit:    Attention deficit disorder, unspecified hyperactivity presence - previously tried on BID dosing, which effects did not last him all day. Currently on TID dosing of focalin and has been on this dose for many years. However some concerns have arose as he's been having insomnia treated with ambien. Also with Care Everywhere, it seems like pt is also now on buspar, xanax and sertraline from another PCP in [place] for anxiety. As anxiety and insomnia can both be potential side effects of focalin, it raises the question of whether he should be on the TID dosing or even another medicine for ADD treatment. Discussed w/ pt that I recommend following up with psychiatry for further management. Pt seemed to be a little defensive and said that all the psychiatrists that he called only takes pediatrics and will not see him if he already carries the diagnosis of ADD. Discussed that I'll refer him to either Dr. Greenberg or Dr. Barrera here at [facility]. i'll also send a message to Dr. Barrera communicating my concerns and see if we can get him an appointment. Once he has an appointment, I can refill it till appt w/ psychiatry and potentially decrease to BID dosing.   -     Pain Clinic Drug Screen  -     Ambulatory Referral to Psychiatry  -     dexmethylphenidate (FOCALIN) 10 MG tablet; Take 1 tablet (10 mg total) by mouth 3 (three) times daily.  -     dexmethylphenidate (FOCALIN) 10 MG tablet; Take 1 tablet (10 mg total) by mouth 3 (three) times daily.  -     dexmethylphenidate (FOCALIN) 10 MG tablet; Take 1 tablet (10 mg total) by mouth 3 (three) times daily.    Insomnia, unspecified type - treated w/ another MD. As above.    Anxiety - treated w/ another MD. As above.    45 minutes was spent on patient with over half the time was spent in coordination of care and/or counseling.    Follow-up if symptoms worsen or fail to improve.      Chely Tran MD  Department of Internal Medicine - [facility]  10:22 AM

## 2018-10-31 NOTE — Clinical Note
Dr. Barrera,I was wondering if it's possible to get Mr. Casper in to see you in the next few months. He's been on focalin 10mg TID for years. He was a former Dr. Diggs patient, whom I inherited. I've spoken to Dr. Diggs prior to him retiring and there wasn't any red flags. However since then, he's been taking ambien for insomnia, xanax/buspar/sertraline for anxiety from his PCP in the [facility]. He's been consistent w/ his focalin and never refills it early. However I had discussed my concerns that he's treating potential side effects of focalin w/ other meds. He declines lowering the dosage but I told him that I'll refill it the last time but he needs to get in to see psychiatry. Chely

## 2018-11-06 LAB
6MAM UR QL: NOT DETECTED
7AMINOCLONAZEPAM UR QL: NOT DETECTED
A-OH ALPRAZ UR QL: PRESENT
ALPRAZ UR QL: NOT DETECTED
AMPHET UR QL SCN: NOT DETECTED
ANNOTATION COMMENT IMP: NORMAL
ANNOTATION COMMENT IMP: NORMAL
BARBITURATES UR QL: NOT DETECTED
BUPRENORPHINE UR QL: NOT DETECTED
BZE UR QL: NOT DETECTED
CARBOXYTHC UR QL: PRESENT
CARISOPRODOL UR QL: NOT DETECTED
CLONAZEPAM UR QL: NOT DETECTED
CODEINE UR QL: NOT DETECTED
CREAT UR-MCNC: 42.7 MG/DL (ref 20–400)
DIAZEPAM UR QL: NOT DETECTED
ETHYL GLUCURONIDE UR QL: NOT DETECTED
FENTANYL UR QL: NOT DETECTED
HYDROCODONE UR QL: NOT DETECTED
HYDROMORPHONE UR QL: NOT DETECTED
LORAZEPAM UR QL: NOT DETECTED
MDA UR QL: NOT DETECTED
MDEA UR QL: NOT DETECTED
MDMA UR QL: NOT DETECTED
ME-PHENIDATE UR QL: NOT DETECTED
MEPERIDINE UR QL: NOT DETECTED
METHADONE UR QL: NOT DETECTED
METHAMPHET UR QL: NOT DETECTED
MIDAZOLAM UR QL SCN: NOT DETECTED
MORPHINE UR QL: NOT DETECTED
NORBUPRENORPHINE UR QL CFM: NOT DETECTED
NORDIAZEPAM UR QL: NOT DETECTED
NORFENTANYL UR QL: NOT DETECTED
NORHYDROCODONE UR QL CFM: NOT DETECTED
NOROXYCODONE UR QL CFM: NOT DETECTED
NOROXYMORPHONE: NOT DETECTED
OXAZEPAM UR QL: NOT DETECTED
OXYCODONE UR QL: NOT DETECTED
OXYMORPHONE UR QL: NOT DETECTED
PATHOLOGY STUDY: NORMAL
PCP UR QL: NOT DETECTED
PHENTERMINE UR QL: NOT DETECTED
PROPOXYPH UR QL: NOT DETECTED
SERVICE CMNT-IMP: NORMAL
TAPENTADOL UR QL SCN: NOT DETECTED
TAPENTADOL-O-SULF: NOT DETECTED
TEMAZEPAM UR QL: NOT DETECTED
TRAMADOL UR QL: NOT DETECTED
ZOLPIDEM UR QL: NOT DETECTED

## 2019-01-08 ENCOUNTER — OFFICE VISIT (OUTPATIENT)
Dept: PSYCHIATRY | Facility: CLINIC | Age: 44
End: 2019-01-08
Payer: COMMERCIAL

## 2019-01-08 VITALS
WEIGHT: 223.56 LBS | BODY MASS INDEX: 33.99 KG/M2 | DIASTOLIC BLOOD PRESSURE: 82 MMHG | HEART RATE: 79 BPM | SYSTOLIC BLOOD PRESSURE: 146 MMHG

## 2019-01-08 DIAGNOSIS — F90.2 ATTENTION DEFICIT HYPERACTIVITY DISORDER (ADHD), COMBINED TYPE: Primary | ICD-10-CM

## 2019-01-08 DIAGNOSIS — F41.9 ANXIETY: ICD-10-CM

## 2019-01-08 PROCEDURE — 99999 PR PBB SHADOW E&M-EST. PATIENT-LVL I: CPT | Mod: PBBFAC,,, | Performed by: PSYCHIATRY & NEUROLOGY

## 2019-01-08 PROCEDURE — 90792 PR PSYCHIATRIC DIAGNOSTIC EVALUATION W/MEDICAL SERVICES: ICD-10-PCS | Mod: S$GLB,,, | Performed by: PSYCHIATRY & NEUROLOGY

## 2019-01-08 PROCEDURE — 90792 PSYCH DIAG EVAL W/MED SRVCS: CPT | Mod: S$GLB,,, | Performed by: PSYCHIATRY & NEUROLOGY

## 2019-01-08 PROCEDURE — 99999 PR PBB SHADOW E&M-EST. PATIENT-LVL I: ICD-10-PCS | Mod: PBBFAC,,, | Performed by: PSYCHIATRY & NEUROLOGY

## 2019-01-08 NOTE — PROGRESS NOTES
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     Reviewed other notes in Epic at [facility]. Notes available since 2012 show focalin prescription for "hyperkinesis" by PCP. Pt reported to PCP Dr Tran when she assumed his care in 2015 that he was diagnosed with ADHD in college and had a battery of tests performed which showed ADHD, which he had given to prior PCP but which were not able to be located within med record system. Dr Tran in last note in October on 10/31 documents that patient also seeing pain management doctor who is prescribing hydrocodone and ambien and another PCP who is prescribing xanax, sertraline and buspar. 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 She referred him to psychiatry to have polypharmacy evaluated and checked urine toxicology screen, which was negative for dexmethylphenidate, hydrocodone and zolpidem (all of which were recently filled) but positive for THC and alprazolam.     Reviewed [place]  over past 5 years, which showed longstanding prescription for combination of dexmethylphenidate 10 mg 90/month, hydrocodone 10 mg 90/month and ambien 10 mg 30/month. Since June 2018 has also been receiving an escalating amount of alprazolam, most recently 60 tabs of alprazolam 0.5 mg on 12/10, while still regularly filling ambien 10 mg and dexmethylphenidate 10 mg tid. Last hydrocodone fill was on 10/25.     Pt today reports that he has had anxiety due to job stress, running out of capital. Also struggles with ADHD sx that " are partially improved with focalin.     Had lots of problems in grade school due to talking too much and not doing his work. Felt he was intelligent and was able to get into college, when he got to college he found he had difficulty with getting things together. On stimulants was able to do well in school and be able to get into grad school at [employer]. Still struggled with distractibility in college. Speaks unfiltered. Disorganized. Racing thoughts. Distractible.Foot starts thumping.  Writes things down but loses notes. Tries to surround him with managers and help that are better organized than he is. Impulsively will make ceisions.Feels focalin helps him create a filter to not say inappropriate things. Takes focalin 10 mg tid, will take first dose at 5:30, then again at 10 and then again around 2. States that wife complains if he doesn't take it because he starts tasks he doesn't finish. Of note last took at 8:30am this morning.     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 Improved when he got restarted on focalin and got caught up with work. Had cardiac work up. Was placed on xanax and lexapro which helped, but states the learned that it was a panic attack and he was able to step away to calm himself down. Had some occasionally before but in the past several months noted that could not step back away to get a break when feeling pre-panic. Feels trapped due to nature of job. Also noted bp has been high the day after panic attack. Got back on zoloft and xanax, by Dr Boyle on the [place] in July 2018. Takes buspar to augment and to counteract the libido issues.    Reports depressive episodes, in 2004 once when anxiety heightens. Denies hx of SI. States that moving fast and being energetic and mentally and physically hyperactive are chronic, these are consistent and do not significantly fluctuate. No  josef or psychosis. Reports chronic problems with sleep, typically only sleeps 5 hours per nigh.     Takes xanax 0.5 mg up to twice per day. Not taking opiods currently.     ROS   Complete review of systems performed covering Constitutional, Eyes, ENT/Mouth, Cardiovascular, Respiratory, Gastrointestinal, Genitourinary, Musculoskeletal, Skin, Neurologic, Endocrine, and Allergy/Immune. All other systems were negative.    Psych ROS covered elsewhere in note (HPI)      PAST PSYCHIATRIC HISTORY  Denies hx of psychiatric hospitalization or suicide attempts.     SUBSTANCE ABUSE HISTORY   Denies tobacco use. Denies alcohol use, apart from 1 beer at occasional business meeting. Denies cocaine or illicit substance use. States he takes less than prescribed of his controlled substances. Denies routine use of cannabis, will occasional vape or use an edible, less than once per week. 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    PAST MEDICAL HISTORY   Hypogonadism. Rotator cuff surgery and other muscle tears from power lifting.    MEDICATIONS   Scheduled and PRN Medications     Current Outpatient Medications:     5-HYDROXYTRYPTOPHAN (5-HTP ORAL), Take by mouth. 1 capsule By mouth every day, Disp: , Rfl:     ALPRAZolam (XANAX) 0.5 MG tablet, , Disp: , Rfl:     amino acids (L-TYROSINE) 500 mg Tab, Take by mouth. 1 Tablet Oral Every day, Disp: , Rfl:     b complex vitamins (VITAMIN B COMPLEX) tablet, Take by mouth. 1 Tablet Oral Every day, Disp: , Rfl:     busPIRone (BUSPAR) 7.5 MG tablet, Take 7.5 mg by mouth 3 (three) times daily., Disp: , Rfl: 5    [START ON 1/23/2019] dexmethylphenidate (FOCALIN) 10 MG tablet, Take 1 tablet (10 mg total) by mouth 3 (three) times daily., Disp: 90 tablet, Rfl: 0    dexmethylphenidate (FOCALIN) 10 MG tablet, Take 1 tablet (10 mg total) by mouth 3 (three) times daily., Disp: 90 tablet, Rfl: 0    fluticasone (FLONASE) 50 mcg/actuation nasal spray, INSTILL 2 SPRAYS DAILY  INTO EACH NOSTRIL, Disp: , Rfl: 5    gabapentin (NEURONTIN) 300 MG capsule, Take 300 mg by mouth 3 (three) times daily., Disp: , Rfl: 5    hydrocodone-acetaminophen 10-325mg (NORCO)  mg Tab, Take 1 tablet by mouth daily as needed., Disp: , Rfl:     ondansetron (ZOFRAN) 4 MG tablet, Take 1 tablet (4 mg total) by mouth every 6 (six) hours as needed for Nausea., Disp: 30 tablet, Rfl: 1    sertraline (ZOLOFT) 50 MG tablet, Take 50 mg by mouth once daily., Disp: , Rfl: 5    sildenafil (VIAGRA) 100 MG tablet, Take 1 tablet (100 mg total) by mouth daily as needed for Erectile Dysfunction., Disp: 10 tablet, Rfl: 3    tizanidine (ZANAFLEX) 4 MG tablet, Take 1 tablet by mouth 2 (two) times daily as needed. , Disp: , Rfl:     zolpidem (AMBIEN) 10 mg Tab, Take 10 mg by mouth nightly as needed. 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  Thought Content: negative for suicidal ideation, homicidal ideation, delusions or hallucinations.  Associations: intact  Memory: grossly intact  Level of Consciousness/Orientation: grossly intact  Fund of Knowledge: good  Attention: distractible  Language: fluent, able to name abstract and concrete objects.  Insight: fair to limited, patient with reliance on short acting addictive medications to  address psychosocial stressors and possible lifestyle choices (xanax for high work stress, past hydrocodone for work out related injuries)  Judgment: limited as above.    Psychomotor signs: no involuntary movements or tremor  Gait: normal    Medical Decision Making    IMPRESSION   42 yo  high functioning man reporing lifelong hx of ADHD that was diagnosed in college, as well as adult onset ADHD related to work stress, currently on multiple medications for anxiety , sleep and ADHD. Pt does present with hx congruent with ADHD with lifelong hyperactivity, impuylsivity, inattention, describes life choices and events that are consistent with diagnosis. ADHD medication regimen dosing is high but not necessarily unwarranted. Do not necessarily feel that stimulants are driving anxiety or insomnia (pt reports chronic dependenc on ambien and see records of regular fills for over 5 years in .) However concern for reliance on short acting reinforcing agents to medicate symptoms that may be secondary to lifestyle choices and overly driven behaviors (xanax for anxiety related to  work, past use of hyhdrocodone to medicate weight lifting injureis). Will need further evaluation to determine exact diagnosis and proper course of treatment.      DIAGNOSES  ATtention Deficit disorder, conmbined type  Anxiety unpsecified.        PLAN  -continue current meds for now.  -recommended that he avoid use of xanax and at minimum use only in severe emergencies due to addictiveness and tendency for rebound anxiety.  -return in one month for reassessment.      ABILITY TO ADHERE TO TREATMENT PLAN  Unclear      Lucy Barrera MD

## 2019-01-12 PROBLEM — F41.9 ANXIETY: Status: ACTIVE | Noted: 2019-01-12

## 2019-02-12 ENCOUNTER — PATIENT MESSAGE (OUTPATIENT)
Dept: PSYCHIATRY | Facility: CLINIC | Age: 44
End: 2019-02-12

## 2019-04-22 DIAGNOSIS — N52.9 ERECTILE DYSFUNCTION, UNSPECIFIED ERECTILE DYSFUNCTION TYPE: ICD-10-CM

## 2019-04-23 RX ORDER — SILDENAFIL 100 MG/1
100 TABLET, FILM COATED ORAL DAILY PRN
Qty: 10 TABLET | Refills: 3 | Status: SHIPPED | OUTPATIENT
Start: 2019-04-23 | End: 2019-05-16 | Stop reason: SDUPTHER

## 2019-04-23 RX ORDER — SILDENAFIL 100 MG/1
100 TABLET, FILM COATED ORAL DAILY PRN
Qty: 10 TABLET | Refills: 1 | OUTPATIENT
Start: 2019-04-23 | End: 2020-04-22

## 2019-05-16 DIAGNOSIS — N52.9 ERECTILE DYSFUNCTION, UNSPECIFIED ERECTILE DYSFUNCTION TYPE: ICD-10-CM

## 2019-05-16 RX ORDER — SILDENAFIL 100 MG/1
100 TABLET, FILM COATED ORAL DAILY PRN
Qty: 10 TABLET | Refills: 3 | Status: SHIPPED | OUTPATIENT
Start: 2019-05-16 | End: 2019-10-18 | Stop reason: SDUPTHER

## 2019-10-18 DIAGNOSIS — N52.9 ERECTILE DYSFUNCTION, UNSPECIFIED ERECTILE DYSFUNCTION TYPE: ICD-10-CM

## 2019-10-18 RX ORDER — SILDENAFIL 100 MG/1
TABLET, FILM COATED ORAL
Qty: 10 TABLET | Refills: 3 | Status: SHIPPED | OUTPATIENT
Start: 2019-10-18 | End: 2020-02-06 | Stop reason: SDUPTHER

## 2020-02-06 DIAGNOSIS — N52.9 ERECTILE DYSFUNCTION, UNSPECIFIED ERECTILE DYSFUNCTION TYPE: ICD-10-CM

## 2020-02-06 RX ORDER — SILDENAFIL 100 MG/1
100 TABLET, FILM COATED ORAL DAILY PRN
Qty: 10 TABLET | Refills: 3 | Status: SHIPPED | OUTPATIENT
Start: 2020-02-06 | End: 2020-06-16

## 2020-06-18 ENCOUNTER — PATIENT MESSAGE (OUTPATIENT)
Dept: INTERNAL MEDICINE | Facility: CLINIC | Age: 45
End: 2020-06-18

## 2020-06-18 DIAGNOSIS — N52.9 ERECTILE DYSFUNCTION, UNSPECIFIED ERECTILE DYSFUNCTION TYPE: ICD-10-CM

## 2020-06-18 RX ORDER — SILDENAFIL 25 MG/1
25-100 TABLET, FILM COATED ORAL
Qty: 30 TABLET | Refills: 0 | Status: SHIPPED | OUTPATIENT
Start: 2020-06-18

## 2020-08-18 DIAGNOSIS — N52.9 ERECTILE DYSFUNCTION, UNSPECIFIED ERECTILE DYSFUNCTION TYPE: ICD-10-CM

## 2020-08-18 RX ORDER — SILDENAFIL 25 MG/1
25-100 TABLET, FILM COATED ORAL
Qty: 30 TABLET | Refills: 0 | Status: CANCELLED | OUTPATIENT
Start: 2020-08-18

## 2020-08-18 RX ORDER — SILDENAFIL 25 MG/1
25-100 TABLET, FILM COATED ORAL
Qty: 5 TABLET | Refills: 5 | OUTPATIENT
Start: 2020-08-18

## 2020-08-18 NOTE — PROGRESS NOTES
Refill Routing Note   Medication(s) are not appropriate for processing by [facility]:       - Non-participating provider           Medication reconciliation completed: No      Automatic Epic Generated Protocol Data:        Requested Prescriptions   Pending Prescriptions Disp Refills    sildenafiL (VIAGRA) 25 MG tablet [Pharmacy Med Name: SILDENAFIL 25 MG TABLET] 5 tablet 5     Sig: TAKE 1-4 TABLETS ( MG TOTAL) BY MOUTH AS NEEDED FOR ERECTILE DYSFUNCTION.       Erectile Dysfunction Medication Protocol Failed - 8/18/2020  9:41 AM        Failed - BP reading in past 12 months     BP Readings from Last 3 Encounters:   10/31/18 118/78   08/20/18 (!) 144/96   06/14/18 (!) 170/90             Failed - Visit with authorizing provider in past 12 months or upcoming 90 days        Passed - Absence of nitrates on med list        Passed - Systolic Pressure is greater than 100              Appointments  past 12m or future 3m with PCP    Date Provider   Last Visit   10/31/2018 Chely Tran MD   Next Visit   Visit date not found Chely Tran MD   ED visits in past 90 days: 0     Note composed:3:17 PM 08/18/2020

## 2020-08-18 NOTE — TELEPHONE ENCOUNTER
Encounter details (provider/department) have been updated by OR staff.   Of note, HF details may not display.     As of this time CDM: Does not populate or display     Updated: Provider    Of note, CDM will not display. PCP not live with [facility].    Will resend refill request encounter to  Centralized Refill Staff Pool.     [facility]     Note composed:9:41 AM 08/18/2020

## 2020-10-05 ENCOUNTER — PATIENT MESSAGE (OUTPATIENT)
Dept: ADMINISTRATIVE | Facility: HOSPITAL | Age: 45
End: 2020-10-05

## 2021-01-04 ENCOUNTER — PATIENT MESSAGE (OUTPATIENT)
Dept: ADMINISTRATIVE | Facility: HOSPITAL | Age: 46
End: 2021-01-04

## 2021-04-05 ENCOUNTER — PATIENT MESSAGE (OUTPATIENT)
Dept: ADMINISTRATIVE | Facility: HOSPITAL | Age: 46
End: 2021-04-05

## 2021-07-07 ENCOUNTER — PATIENT MESSAGE (OUTPATIENT)
Dept: ADMINISTRATIVE | Facility: HOSPITAL | Age: 46
End: 2021-07-07

## 2021-10-04 ENCOUNTER — PATIENT MESSAGE (OUTPATIENT)
Dept: ADMINISTRATIVE | Facility: HOSPITAL | Age: 46
End: 2021-10-04

## 2022-03-18 ENCOUNTER — ANESTHESIA EVENT (OUTPATIENT)
Dept: SURGERY | Facility: OTHER | Age: 47
End: 2022-03-18
Payer: COMMERCIAL

## 2022-03-18 ENCOUNTER — HOSPITAL ENCOUNTER (OUTPATIENT)
Dept: PREADMISSION TESTING | Facility: OTHER | Age: 47
Discharge: HOME | End: 2022-03-18
Attending: ORTHOPAEDIC SURGERY
Payer: COMMERCIAL

## 2022-03-18 VITALS
TEMPERATURE: 97 F | SYSTOLIC BLOOD PRESSURE: 180 MMHG | OXYGEN SATURATION: 100 % | HEIGHT: 68 IN | HEART RATE: 84 BPM | DIASTOLIC BLOOD PRESSURE: 102 MMHG | BODY MASS INDEX: 31.07 KG/M2 | WEIGHT: 205 LBS

## 2022-03-18 RX ORDER — FAMOTIDINE 20 MG/1
20 TABLET, FILM COATED ORAL
Status: CANCELLED | OUTPATIENT
Start: 2022-03-18 | End: 2022-03-18

## 2022-03-18 RX ORDER — ACETAMINOPHEN 500 MG
1000 TABLET ORAL
Status: CANCELLED | OUTPATIENT
Start: 2022-03-18 | End: 2022-03-18

## 2022-03-18 RX ORDER — SODIUM CHLORIDE, SODIUM LACTATE, POTASSIUM CHLORIDE, CALCIUM CHLORIDE 600; 310; 30; 20 MG/100ML; MG/100ML; MG/100ML; MG/100ML
INJECTION, SOLUTION INTRAVENOUS CONTINUOUS
Status: CANCELLED | OUTPATIENT
Start: 2022-03-18

## 2022-03-18 RX ORDER — LIDOCAINE HYDROCHLORIDE 10 MG/ML
0.5 INJECTION, SOLUTION EPIDURAL; INFILTRATION; INTRACAUDAL; PERINEURAL ONCE
Status: CANCELLED | OUTPATIENT
Start: 2022-03-18 | End: 2022-03-18

## 2022-03-18 NOTE — ANESTHESIA PREPROCEDURE EVALUATION
03/18/2022  Ernesto Casper is a 46 y.o., male.      Pre-op Assessment    I have reviewed the Patient Summary Reports.     I have reviewed the Nursing Notes. I have reviewed the NPO Status.   I have reviewed the Medications.     Review of Systems  Anesthesia Hx:  Denies Family Hx of Anesthesia complications.   Denies Personal Hx of Anesthesia complications.   Social:  Non-Smoker    Hematology/Oncology:  Hematology Normal   Oncology Normal     EENT/Dental:EENT/Dental Normal   Cardiovascular:   Denies Hypertension.  180/102 in clinic, states when not stressed runs normal   Pulmonary:  Pulmonary Normal    Renal/:  Renal/ Normal     Hepatic/GI:   GERD (diet rx)    Musculoskeletal:   Arthritis (berna shoulders)     Neurological:   Chronic Pain Syndrome (daily norco, neurontin)   Endocrine:  Endocrine Normal    Dermatological:  Skin Normal    Psych:   anxiety          Physical Exam  General: Cooperative, Alert and Oriented    Airway:  Mallampati: III   Mouth Opening: Normal  TM Distance: Normal  Neck ROM: Normal ROM  Neck: Girth Increased    Dental:  Intact        Anesthesia Plan  Type of Anesthesia, risks & benefits discussed:    Anesthesia Type: Gen ETT  Intra-op Monitoring Plan: Standard ASA Monitors  Post Op Pain Control Plan: multimodal analgesia, IV/PO Opioids PRN and peripheral nerve block  Induction:  IV  Airway Plan: Video  Informed Consent: Informed consent signed with the Patient and all parties understand the risks and agree with anesthesia plan.  All questions answered.   ASA Score: 2  Anesthesia Plan Notes: Outside labs obtained    Ready For Surgery From Anesthesia Perspective.     .

## 2022-03-22 ENCOUNTER — HOSPITAL ENCOUNTER (OUTPATIENT)
Facility: OTHER | Age: 47
Discharge: HOME | End: 2022-03-22
Attending: ORTHOPAEDIC SURGERY | Admitting: ORTHOPAEDIC SURGERY
Payer: COMMERCIAL

## 2022-03-22 ENCOUNTER — ANESTHESIA (OUTPATIENT)
Dept: SURGERY | Facility: OTHER | Age: 47
End: 2022-03-22
Payer: COMMERCIAL

## 2022-03-22 VITALS
HEIGHT: 68 IN | RESPIRATION RATE: 18 BRPM | DIASTOLIC BLOOD PRESSURE: 85 MMHG | HEART RATE: 91 BPM | OXYGEN SATURATION: 97 % | BODY MASS INDEX: 31.07 KG/M2 | TEMPERATURE: 98 F | SYSTOLIC BLOOD PRESSURE: 161 MMHG | WEIGHT: 205 LBS

## 2022-03-22 DIAGNOSIS — Z01.818 PRE-OP TESTING: ICD-10-CM

## 2022-03-22 DIAGNOSIS — M75.122 COMPLETE TEAR OF LEFT ROTATOR CUFF, UNSPECIFIED WHETHER TRAUMATIC: Primary | ICD-10-CM

## 2022-03-22 DIAGNOSIS — S46.012A TRAUMATIC COMPLETE TEAR OF LEFT ROTATOR CUFF, INITIAL ENCOUNTER: ICD-10-CM

## 2022-03-22 DIAGNOSIS — M75.122 COMPLETE ROTATOR CUFF TEAR OR RUPTURE OF LEFT SHOULDER, NOT SPECIFIED AS TRAUMATIC: ICD-10-CM

## 2022-03-22 PROBLEM — M75.42 IMPINGEMENT SYNDROME OF LEFT SHOULDER: Status: ACTIVE | Noted: 2022-03-22

## 2022-03-22 PROBLEM — M25.512 LEFT SHOULDER PAIN: Status: ACTIVE | Noted: 2022-03-22

## 2022-03-22 PROCEDURE — 63600175 PHARM REV CODE 636 W HCPCS: Performed by: NURSE ANESTHETIST, CERTIFIED REGISTERED

## 2022-03-22 PROCEDURE — 71000039 HC RECOVERY, EACH ADD'L HOUR: Performed by: ORTHOPAEDIC SURGERY

## 2022-03-22 PROCEDURE — 25000003 PHARM REV CODE 250: Performed by: NURSE ANESTHETIST, CERTIFIED REGISTERED

## 2022-03-22 PROCEDURE — 71000033 HC RECOVERY, INTIAL HOUR: Performed by: ORTHOPAEDIC SURGERY

## 2022-03-22 PROCEDURE — 36000707: Performed by: ORTHOPAEDIC SURGERY

## 2022-03-22 PROCEDURE — 25000003 PHARM REV CODE 250: Performed by: ANESTHESIOLOGY

## 2022-03-22 PROCEDURE — 63600175 PHARM REV CODE 636 W HCPCS: Performed by: ANESTHESIOLOGY

## 2022-03-22 PROCEDURE — 71000015 HC POSTOP RECOV 1ST HR: Performed by: ORTHOPAEDIC SURGERY

## 2022-03-22 PROCEDURE — 36000706: Performed by: ORTHOPAEDIC SURGERY

## 2022-03-22 PROCEDURE — 63600175 PHARM REV CODE 636 W HCPCS: Performed by: ORTHOPAEDIC SURGERY

## 2022-03-22 PROCEDURE — 76942 ECHO GUIDE FOR BIOPSY: CPT | Performed by: ANESTHESIOLOGY

## 2022-03-22 PROCEDURE — 37000008 HC ANESTHESIA 1ST 15 MINUTES: Performed by: ORTHOPAEDIC SURGERY

## 2022-03-22 PROCEDURE — 63600175 PHARM REV CODE 636 W HCPCS

## 2022-03-22 PROCEDURE — 37000009 HC ANESTHESIA EA ADD 15 MINS: Performed by: ORTHOPAEDIC SURGERY

## 2022-03-22 PROCEDURE — 25000003 PHARM REV CODE 250: Performed by: ORTHOPAEDIC SURGERY

## 2022-03-22 RX ORDER — FENTANYL CITRATE 50 UG/ML
INJECTION, SOLUTION INTRAMUSCULAR; INTRAVENOUS
Status: DISCONTINUED | OUTPATIENT
Start: 2022-03-22 | End: 2022-03-22

## 2022-03-22 RX ORDER — ROPIVACAINE HYDROCHLORIDE 5 MG/ML
INJECTION, SOLUTION EPIDURAL; INFILTRATION; PERINEURAL
Status: COMPLETED | OUTPATIENT
Start: 2022-03-22 | End: 2022-03-22

## 2022-03-22 RX ORDER — HYDRALAZINE HYDROCHLORIDE 20 MG/ML
INJECTION INTRAMUSCULAR; INTRAVENOUS
Status: DISCONTINUED
Start: 2022-03-22 | End: 2022-03-22 | Stop reason: HOSPADM

## 2022-03-22 RX ORDER — VASOPRESSIN 20 [USP'U]/ML
INJECTION, SOLUTION INTRAMUSCULAR; SUBCUTANEOUS
Status: DISCONTINUED | OUTPATIENT
Start: 2022-03-22 | End: 2022-03-22

## 2022-03-22 RX ORDER — MIDAZOLAM HYDROCHLORIDE 1 MG/ML
INJECTION INTRAMUSCULAR; INTRAVENOUS
Status: DISCONTINUED | OUTPATIENT
Start: 2022-03-22 | End: 2022-03-22

## 2022-03-22 RX ORDER — PROPOFOL 10 MG/ML
VIAL (ML) INTRAVENOUS
Status: DISCONTINUED | OUTPATIENT
Start: 2022-03-22 | End: 2022-03-22

## 2022-03-22 RX ORDER — OXYCODONE HYDROCHLORIDE 5 MG/1
5 TABLET ORAL
Status: DISCONTINUED | OUTPATIENT
Start: 2022-03-22 | End: 2022-03-22 | Stop reason: HOSPADM

## 2022-03-22 RX ORDER — LIDOCAINE HYDROCHLORIDE 20 MG/ML
INJECTION INTRAVENOUS
Status: DISCONTINUED | OUTPATIENT
Start: 2022-03-22 | End: 2022-03-22

## 2022-03-22 RX ORDER — PHENYLEPHRINE HYDROCHLORIDE 10 MG/ML
INJECTION INTRAVENOUS
Status: DISCONTINUED | OUTPATIENT
Start: 2022-03-22 | End: 2022-03-22

## 2022-03-22 RX ORDER — TRANEXAMIC ACID 100 MG/ML
INJECTION, SOLUTION INTRAVENOUS
Status: DISCONTINUED | OUTPATIENT
Start: 2022-03-22 | End: 2022-03-22

## 2022-03-22 RX ORDER — DIPHENHYDRAMINE HYDROCHLORIDE 50 MG/ML
25 INJECTION INTRAMUSCULAR; INTRAVENOUS EVERY 6 HOURS PRN
Status: DISCONTINUED | OUTPATIENT
Start: 2022-03-22 | End: 2022-03-22 | Stop reason: HOSPADM

## 2022-03-22 RX ORDER — ROCURONIUM BROMIDE 10 MG/ML
INJECTION, SOLUTION INTRAVENOUS
Status: DISCONTINUED | OUTPATIENT
Start: 2022-03-22 | End: 2022-03-22

## 2022-03-22 RX ORDER — EPHEDRINE SULFATE 50 MG/ML
INJECTION, SOLUTION INTRAVENOUS
Status: DISCONTINUED | OUTPATIENT
Start: 2022-03-22 | End: 2022-03-22

## 2022-03-22 RX ORDER — ATROPINE SULFATE 0.4 MG/ML
INJECTION, SOLUTION ENDOTRACHEAL; INTRAMEDULLARY; INTRAMUSCULAR; INTRAVENOUS; SUBCUTANEOUS
Status: DISCONTINUED | OUTPATIENT
Start: 2022-03-22 | End: 2022-03-22

## 2022-03-22 RX ORDER — HYDROMORPHONE HYDROCHLORIDE 2 MG/ML
0.4 INJECTION, SOLUTION INTRAMUSCULAR; INTRAVENOUS; SUBCUTANEOUS EVERY 5 MIN PRN
Status: DISCONTINUED | OUTPATIENT
Start: 2022-03-22 | End: 2022-03-22 | Stop reason: HOSPADM

## 2022-03-22 RX ORDER — MIDAZOLAM HYDROCHLORIDE 1 MG/ML
2 INJECTION INTRAMUSCULAR; INTRAVENOUS ONCE
Status: COMPLETED | OUTPATIENT
Start: 2022-03-22 | End: 2022-03-22

## 2022-03-22 RX ORDER — HYDRALAZINE HYDROCHLORIDE 20 MG/ML
10 INJECTION INTRAMUSCULAR; INTRAVENOUS ONCE
Status: COMPLETED | OUTPATIENT
Start: 2022-03-22 | End: 2022-03-22

## 2022-03-22 RX ORDER — ONDANSETRON 4 MG/1
8 TABLET, ORALLY DISINTEGRATING ORAL EVERY 8 HOURS PRN
Qty: 10 TABLET | Refills: 1 | Status: ON HOLD | OUTPATIENT
Start: 2022-03-22 | End: 2022-05-05 | Stop reason: HOSPADM

## 2022-03-22 RX ORDER — PROCHLORPERAZINE EDISYLATE 5 MG/ML
5 INJECTION INTRAMUSCULAR; INTRAVENOUS EVERY 30 MIN PRN
Status: DISCONTINUED | OUTPATIENT
Start: 2022-03-22 | End: 2022-03-22 | Stop reason: HOSPADM

## 2022-03-22 RX ORDER — MEPERIDINE HYDROCHLORIDE 25 MG/ML
12.5 INJECTION INTRAMUSCULAR; INTRAVENOUS; SUBCUTANEOUS ONCE AS NEEDED
Status: DISCONTINUED | OUTPATIENT
Start: 2022-03-22 | End: 2022-03-22 | Stop reason: HOSPADM

## 2022-03-22 RX ORDER — SODIUM CHLORIDE 0.9 % (FLUSH) 0.9 %
3 SYRINGE (ML) INJECTION
Status: DISCONTINUED | OUTPATIENT
Start: 2022-03-22 | End: 2022-03-22 | Stop reason: HOSPADM

## 2022-03-22 RX ORDER — SODIUM CHLORIDE, SODIUM LACTATE, POTASSIUM CHLORIDE, CALCIUM CHLORIDE 600; 310; 30; 20 MG/100ML; MG/100ML; MG/100ML; MG/100ML
INJECTION, SOLUTION INTRAVENOUS CONTINUOUS
Status: DISCONTINUED | OUTPATIENT
Start: 2022-03-22 | End: 2022-03-22 | Stop reason: HOSPADM

## 2022-03-22 RX ORDER — LIDOCAINE HYDROCHLORIDE 10 MG/ML
0.5 INJECTION, SOLUTION EPIDURAL; INFILTRATION; INTRACAUDAL; PERINEURAL ONCE
Status: DISCONTINUED | OUTPATIENT
Start: 2022-03-22 | End: 2022-03-22 | Stop reason: HOSPADM

## 2022-03-22 RX ORDER — FAMOTIDINE 20 MG/1
20 TABLET, FILM COATED ORAL
Status: COMPLETED | OUTPATIENT
Start: 2022-03-22 | End: 2022-03-22

## 2022-03-22 RX ORDER — CLINDAMYCIN PHOSPHATE 900 MG/50ML
900 INJECTION, SOLUTION INTRAVENOUS ONCE
Status: COMPLETED | OUTPATIENT
Start: 2022-03-22 | End: 2022-03-22

## 2022-03-22 RX ORDER — HYDROCODONE BITARTRATE AND ACETAMINOPHEN 5; 325 MG/1; MG/1
1 TABLET ORAL EVERY 6 HOURS PRN
Qty: 10 TABLET | Refills: 0 | Status: ON HOLD | OUTPATIENT
Start: 2022-03-22 | End: 2022-05-05 | Stop reason: HOSPADM

## 2022-03-22 RX ORDER — ACETAMINOPHEN 500 MG
1000 TABLET ORAL
Status: COMPLETED | OUTPATIENT
Start: 2022-03-22 | End: 2022-03-22

## 2022-03-22 RX ADMIN — TRANEXAMIC ACID 1000 MG: 100 INJECTION, SOLUTION INTRAVENOUS at 12:03

## 2022-03-22 RX ADMIN — MIDAZOLAM HYDROCHLORIDE 2 MG: 1 INJECTION, SOLUTION INTRAMUSCULAR; INTRAVENOUS at 11:03

## 2022-03-22 RX ADMIN — ACETAMINOPHEN 1000 MG: 500 TABLET, FILM COATED ORAL at 10:03

## 2022-03-22 RX ADMIN — EPHEDRINE SULFATE 30 MG: 50 INJECTION INTRAVENOUS at 12:03

## 2022-03-22 RX ADMIN — SODIUM CHLORIDE, SODIUM LACTATE, POTASSIUM CHLORIDE, AND CALCIUM CHLORIDE: 600; 310; 30; 20 INJECTION, SOLUTION INTRAVENOUS at 10:03

## 2022-03-22 RX ADMIN — ROPIVACAINE HYDROCHLORIDE 25 ML: 5 INJECTION, SOLUTION EPIDURAL; INFILTRATION; PERINEURAL at 11:03

## 2022-03-22 RX ADMIN — FAMOTIDINE 20 MG: 20 TABLET, FILM COATED ORAL at 10:03

## 2022-03-22 RX ADMIN — PROPOFOL 200 MG: 10 INJECTION, EMULSION INTRAVENOUS at 12:03

## 2022-03-22 RX ADMIN — HYDRALAZINE HYDROCHLORIDE 10 MG: 20 INJECTION, SOLUTION INTRAMUSCULAR; INTRAVENOUS at 02:03

## 2022-03-22 RX ADMIN — LIDOCAINE HYDROCHLORIDE 50 MG: 20 INJECTION, SOLUTION INTRAVENOUS at 12:03

## 2022-03-22 RX ADMIN — ATROPINE SULFATE 0.2 MG: 0.4 INJECTION, SOLUTION INTRAMUSCULAR; INTRAVENOUS; SUBCUTANEOUS at 12:03

## 2022-03-22 RX ADMIN — FENTANYL CITRATE 100 MCG: 50 INJECTION, SOLUTION INTRAMUSCULAR; INTRAVENOUS at 11:03

## 2022-03-22 RX ADMIN — PHENYLEPHRINE HYDROCHLORIDE 300 MCG: 10 INJECTION INTRAVENOUS at 12:03

## 2022-03-22 RX ADMIN — CLINDAMYCIN PHOSPHATE 900 MG: 18 INJECTION, SOLUTION INTRAVENOUS at 12:03

## 2022-03-22 RX ADMIN — VASOPRESSIN 2 UNITS: 20 INJECTION, SOLUTION INTRAMUSCULAR; SUBCUTANEOUS at 12:03

## 2022-03-22 RX ADMIN — MIDAZOLAM HYDROCHLORIDE 2 MG: 1 INJECTION, SOLUTION INTRAMUSCULAR; INTRAVENOUS at 02:03

## 2022-03-22 RX ADMIN — ROCURONIUM BROMIDE 50 MG: 10 INJECTION, SOLUTION INTRAVENOUS at 12:03

## 2022-03-22 NOTE — ADDENDUM NOTE
Addendum  created 03/22/22 8686 by Caleb Aguilera MD    Child order released for a procedure order, Clinical Note Signed, Intraprocedure Blocks edited, LDA created via procedure documentation, SmartForm saved

## 2022-03-22 NOTE — ANESTHESIA PROCEDURE NOTES
Right radial    Diagnosis: hypotension    Patient location during procedure: done in OR  Procedure start time: 3/22/2022 12:57 PM  Procedure end time: 3/22/2022 12:59 PM    Staffing  Authorizing Provider: Caleb Aguilera MD  Performing Provider: Caleb Aguilera MD    Anesthesiologist was present at the time of the procedure.  Right radial  Skin Prep: chlorhexidine gluconate  Local Infiltration: none  Orientation: right  Location: radial    Catheter Size: 20 G  Catheter placement by Ultrasound guidance. Heme positive aspiration all ports.   Vessel Caliber: medium, patent, compressibility normal  Vascular Doppler:  not done  Needle advanced into vessel with real time Ultrasound guidance.  Guidewire confirmed in vessel.  Sterile sheath used.Insertion Attempts: 1  Assessment  Dressing: secured with tape and tegaderm

## 2022-03-22 NOTE — TRANSFER OF CARE
"Anesthesia Transfer of Care Note    Patient: Ernesto Casper    Procedure(s) Performed: Procedure(s) (LRB):  ARTHROSCOPY, SHOULDER - MASSIVE INFERIOR CAPSULAR RELEASE, SUPERIOR RECONSTRUCTION, POSSIBLE PATCH AUGMENTATION, POSSIBLE DON IN-SPACE BALLOON PLACEMENT (Left)  REPAIR, ROTATOR CUFF (Left)  ARTHROSCOPY, SHOULDER, WITH SUBACROMIAL SPACE DECOMPRESSION (Left)    Anesthesia Type: general    Transport from OR: Transported from OR on 6-10 L/min O2 by face mask with adequate spontaneous ventilation    Post pain: adequate analgesia    Post assessment: no apparent anesthetic complications    Post vital signs: stable    Level of consciousness: awake    Nausea/Vomiting: no nausea/vomiting    Complications: other (see comments)    Transfer of care protocol was followedComments: Case canceled after induction. Severe pre op hypertension then persistent hypotension and difficulties obtaining accurate NIBPs once induced. A line placed per Dr Aguilera.   Patient stable in PACU.      Last vitals:   Visit Vitals  BP (!) 154/88 (BP Location: Left arm, Patient Position: Sitting)   Pulse 73   Temp 36.7 °C (98 °F) (Oral)   Resp 20   Ht 5' 8" (1.727 m)   Wt 93 kg (205 lb)   SpO2 100%   BMI 31.17 kg/m²     "

## 2022-03-22 NOTE — PROGRESS NOTES
Surgery canceled while intraoperative.    After induction but prior to incision patient experienced hypotension with EKG changes.  Arterial line placed, stabilized but given no prior cardiac history we were concerned for high risk of potential intraoperative complications in a potential several hour surgery.    We will get him a cardiac evaluation and workup as an outpatient and potentially reschedule if cleared.

## 2022-03-22 NOTE — ANESTHESIA PROCEDURE NOTES
Left sup trunk    Patient location during procedure: holding area   Block not for primary anesthetic.  Reason for block: at surgeon's request and post-op pain management   Post-op Pain Location: Left shoulder   Timeout: 3/22/2022 11:02 AM   End time: 3/22/2022 11:08 AM    Staffing  Authorizing Provider: Caleb Aguilera MD  Performing Provider: Caleb Aguilera MD    Preanesthetic Checklist  Completed: patient identified, IV checked, site marked, risks and benefits discussed, surgical consent, monitors and equipment checked, pre-op evaluation and timeout performed  Peripheral Block  Prep: ChloraPrep  Patient monitoring: heart rate, cardiac monitor, continuous pulse ox and frequent blood pressure checks  Block type: Sup Trunk  Laterality: left  Injection technique: single shot  Needle  Needle gauge: 21 G  Needle length: 4 in  Needle localization: ultrasound guidance and anatomical landmarks   -ultrasound image captured on disc.  Assessment  Injection assessment: negative aspiration and negative parasthesia  Paresthesia pain: none  Heart rate change: no  Slow fractionated injection: yes  Pain Tolerance: comfortable throughout block and no complaints  Medications:    Medications: ropivacaine (NAROPIN) injection 0.5% - Perineural   25 mL - 3/22/2022 11:08:00 AM

## 2022-03-22 NOTE — PLAN OF CARE
Ernesto Casper has met all discharge criteria from Phase II. Vital Signs are stable, ambulating  without difficulty. Discharge instructions given, patient verbalized understanding. Discharged from facility via wheelchair in stable condition.

## 2022-03-22 NOTE — ANESTHESIA POSTPROCEDURE EVALUATION
Anesthesia Post Evaluation    Patient: Ernesto Casper    Procedure(s) Performed: Procedure(s) (LRB):  ARTHROSCOPY, SHOULDER - MASSIVE INFERIOR CAPSULAR RELEASE, SUPERIOR RECONSTRUCTION, POSSIBLE PATCH AUGMENTATION, POSSIBLE DON IN-SPACE BALLOON PLACEMENT (Left)  REPAIR, ROTATOR CUFF (Left)  ARTHROSCOPY, SHOULDER, WITH SUBACROMIAL SPACE DECOMPRESSION (Left)    Final Anesthesia Type: general      Patient location during evaluation: PACU  Patient participation: Yes- Able to Participate  Level of consciousness: awake and alert  Post-procedure vital signs: reviewed and stable  Pain management: adequate  Airway patency: patent    PONV status at discharge: No PONV  Anesthetic complications: yes  Perioperative Events: hypotension requiring unaticipated pressor infusion      Julienne-operative Events Comments: Pt became hypotensive after lateral positioning- which at first didn't respond to vasopressors. An A-line was started and pt slowly responded becoming normotensive, however at this point after discussion with Dr Shaffer who was concerned about length of surgery, decision was made to abort procedure. Pt to check back with primary care physician to get high blood pressure under control  Cardiovascular status: blood pressure returned to baseline  Respiratory status: unassisted and room air  Hydration status: euvolemic  Follow-up not needed.          Vitals Value Taken Time   /85 03/22/22 1513   Temp 36.8 °C (98.3 °F) 03/22/22 1513   Pulse 91 03/22/22 1513   Resp 18 03/22/22 1513   SpO2 97 % 03/22/22 1513         Event Time   Out of Recovery 15:06:33         Pain/Trevon Score: Pain Rating Prior to Med Admin: 6 (3/22/2022 10:26 AM)  Trevon Score: 10 (3/22/2022  3:13 PM)

## 2022-04-21 ENCOUNTER — ANESTHESIA EVENT (OUTPATIENT)
Dept: SURGERY | Facility: OTHER | Age: 47
End: 2022-04-21
Payer: COMMERCIAL

## 2022-04-21 DIAGNOSIS — T88.4XXD HARD TO INTUBATE, SUBSEQUENT ENCOUNTER: Primary | ICD-10-CM

## 2022-04-21 NOTE — ANESTHESIA PREPROCEDURE EVALUATION
05/05/2022  Ernesto Casper is a 47 y.o., male.      Pre-op Assessment    I have reviewed the Patient Summary Reports.     I have reviewed the Nursing Notes. I have reviewed the NPO Status.   I have reviewed the Medications.     Review of Systems  Anesthesia Hx:  Denies Family Hx of Anesthesia complications.   Denies Personal Hx of Anesthesia complications.   Social:  Non-Smoker    Hematology/Oncology:  Hematology Normal   Oncology Normal     EENT/Dental:EENT/Dental Normal   Cardiovascular:   Denies Hypertension.  180/102 in clinic, states when not stressed runs normal   Pulmonary:  Pulmonary Normal    Renal/:  Renal/ Normal     Hepatic/GI:   GERD (diet rx)    Musculoskeletal:   Arthritis (berna shoulders)     Neurological:   Chronic Pain Syndrome (daily norco, neurontin)   Endocrine:  Endocrine Normal    Dermatological:  Skin Normal    Psych:   anxiety          Physical Exam  General: Cooperative, Alert and Oriented    Airway:  Mallampati: III   Mouth Opening: Normal  TM Distance: Normal  Neck ROM: Normal ROM  Neck: Girth Increased    Dental:  Intact        Anesthesia Plan  Type of Anesthesia, risks & benefits discussed:    Anesthesia Type: Gen ETT  Intra-op Monitoring Plan: Standard ASA Monitors  Post Op Pain Control Plan: multimodal analgesia, IV/PO Opioids PRN and peripheral nerve block  Induction:  IV  Airway Plan: Video  Informed Consent: Informed consent signed with the Patient and all parties understand the risks and agree with anesthesia plan.  All questions answered.   ASA Score: 2  Anesthesia Plan Notes: Outside labs obtained    Ready For Surgery From Anesthesia Perspective.     .                                                                                                                 05/05/2022  Ernesto Casper is a 47 y.o., male.      Pre-op Assessment    I have reviewed the  Patient Summary Reports.     I have reviewed the Nursing Notes. I have reviewed the NPO Status.   I have reviewed the Medications.     Review of Systems  Anesthesia Hx:  Denies Family Hx of Anesthesia complications.   Denies Personal Hx of Anesthesia complications.   Social:  Non-Smoker    Hematology/Oncology:  Hematology Normal   Oncology Normal     EENT/Dental:EENT/Dental Normal   Cardiovascular:   Denies Hypertension.  180/102 in clinic, states when not stressed runs normal   Pulmonary:  Pulmonary Normal    Renal/:  Renal/ Normal     Hepatic/GI:   GERD (diet rx)    Musculoskeletal:   Arthritis (berna shoulders)     Neurological:   Chronic Pain Syndrome (daily norco, neurontin)   Endocrine:  Endocrine Normal    Dermatological:  Skin Normal    Psych:   anxiety          Physical Exam  General: Cooperative, Alert and Oriented    Airway:  Mallampati: III   Mouth Opening: Normal  TM Distance: Normal  Neck ROM: Normal ROM  Neck: Girth Increased    Dental:  Intact        Anesthesia Plan  Type of Anesthesia, risks & benefits discussed:    Anesthesia Type: Gen ETT  Intra-op Monitoring Plan: Standard ASA Monitors  Post Op Pain Control Plan: multimodal analgesia, IV/PO Opioids PRN and peripheral nerve block  Induction:  IV  Airway Plan: Video  Informed Consent: Informed consent signed with the Patient and all parties understand the risks and agree with anesthesia plan.  All questions answered.   ASA Score: 2  Anesthesia Plan Notes: Outside labs obtained    Ready For Surgery From Anesthesia Perspective.     .                                                                                                                 05/05/2022  Ernesto Casper is a 47 y.o., male.      Pre-op Assessment    I have reviewed the Patient Summary Reports.     I have reviewed the Nursing Notes. I have reviewed the NPO Status.   I have reviewed the Medications.     Review of Systems  Anesthesia Hx:  Denies Family Hx of Anesthesia  complications.   Denies Personal Hx of Anesthesia complications.   Social:  Non-Smoker    Hematology/Oncology:  Hematology Normal   Oncology Normal     EENT/Dental:EENT/Dental Normal   Cardiovascular:   Denies Hypertension.  180/102 in clinic, states when not stressed runs normal   Pulmonary:  Pulmonary Normal    Renal/:  Renal/ Normal     Hepatic/GI:   GERD (diet rx)    Musculoskeletal:   Arthritis (berna shoulders)     Neurological:   Chronic Pain Syndrome (daily norco, neurontin)   Endocrine:  Endocrine Normal    Dermatological:  Skin Normal    Psych:   anxiety          Physical Exam  General: Cooperative, Alert and Oriented    Airway:  Mallampati: III   Mouth Opening: Normal  TM Distance: Normal  Neck ROM: Normal ROM  Neck: Girth Increased    Dental:  Intact        Anesthesia Plan  Type of Anesthesia, risks & benefits discussed:    Anesthesia Type: Gen ETT  Intra-op Monitoring Plan: Standard ASA Monitors  Post Op Pain Control Plan: multimodal analgesia, IV/PO Opioids PRN and peripheral nerve block  Induction:  IV  Airway Plan: Video  Informed Consent: Informed consent signed with the Patient and all parties understand the risks and agree with anesthesia plan.  All questions answered.   ASA Score: 2  Anesthesia Plan Notes: Outside labs obtained    Ready For Surgery From Anesthesia Perspective.     .                                                                                                                 04/21/2022  Ernesto Casper is a 46 y.o., male.      Pre-op Assessment    I have reviewed the Patient Summary Reports.     I have reviewed the Nursing Notes. I have reviewed the NPO Status.   I have reviewed the Medications.     Review of Systems  Social:  Non-Smoker    Cardiovascular:   Exercise tolerance: good    Pulmonary:  Pulmonary Normal    Hepatic/GI:   GERD, well controlled    Endocrine:  Endocrine Normal    Psych:   Psychiatric History          Physical Exam  General: Well  nourished    Airway:  Mallampati: II   Mouth Opening: Normal  TM Distance: Normal  Tongue: Normal  Neck ROM: Normal ROM    Dental:  Intact        Anesthesia Plan  Type of Anesthesia, risks & benefits discussed:    Anesthesia Type: Gen ETT  Intra-op Monitoring Plan: Standard ASA Monitors  Post Op Pain Control Plan: multimodal analgesia and peripheral nerve block  Induction:  IV  Airway Plan: Video  Informed Consent: Informed consent signed with the Patient and all parties understand the risks and agree with anesthesia plan.  All questions answered.   ASA Score: 2    Ready For Surgery From Anesthesia Perspective.     .

## 2022-04-21 NOTE — PRE ADMISSION SCREENING
Patient contacted per Dr. Armstrong, Anesthesia.  Previous surgery cancelled due to BP issues. Concerns addressed for rescheduled surgery on 5/5/22 with recommendation given to stop all supplements prior to surgery.

## 2022-05-05 ENCOUNTER — ANESTHESIA (OUTPATIENT)
Dept: SURGERY | Facility: OTHER | Age: 47
End: 2022-05-05
Payer: COMMERCIAL

## 2022-05-05 ENCOUNTER — HOSPITAL ENCOUNTER (OUTPATIENT)
Facility: OTHER | Age: 47
Discharge: HOME | End: 2022-05-05
Attending: ORTHOPAEDIC SURGERY | Admitting: ORTHOPAEDIC SURGERY
Payer: COMMERCIAL

## 2022-05-05 DIAGNOSIS — M75.121 NONTRAUMATIC COMPLETE TEAR OF RIGHT ROTATOR CUFF: ICD-10-CM

## 2022-05-05 DIAGNOSIS — Z01.818 PRE-OP TESTING: ICD-10-CM

## 2022-05-05 DIAGNOSIS — M75.121 COMPLETE TEAR OF RIGHT ROTATOR CUFF, UNSPECIFIED WHETHER TRAUMATIC: Primary | ICD-10-CM

## 2022-05-05 PROBLEM — M75.41 IMPINGEMENT SYNDROME OF RIGHT SHOULDER: Status: ACTIVE | Noted: 2022-05-05

## 2022-05-05 PROBLEM — S43.431A SUPERIOR GLENOID LABRUM LESION OF RIGHT SHOULDER: Status: ACTIVE | Noted: 2022-05-05

## 2022-05-05 PROBLEM — T88.4XXA HARD TO INTUBATE: Status: ACTIVE | Noted: 2022-05-05

## 2022-05-05 PROCEDURE — 25000003 PHARM REV CODE 250: Performed by: ANESTHESIOLOGY

## 2022-05-05 PROCEDURE — 64415 NJX AA&/STRD BRCH PLXS IMG: CPT | Mod: 59,RT | Performed by: ANESTHESIOLOGY

## 2022-05-05 PROCEDURE — 71000039 HC RECOVERY, EACH ADD'L HOUR: Performed by: ORTHOPAEDIC SURGERY

## 2022-05-05 PROCEDURE — C9290 INJ, BUPIVACAINE LIPOSOME: HCPCS | Performed by: ORTHOPAEDIC SURGERY

## 2022-05-05 PROCEDURE — 36000710: Performed by: ORTHOPAEDIC SURGERY

## 2022-05-05 PROCEDURE — 25000003 PHARM REV CODE 250: Performed by: ORTHOPAEDIC SURGERY

## 2022-05-05 PROCEDURE — 63600175 PHARM REV CODE 636 W HCPCS: Performed by: ANESTHESIOLOGY

## 2022-05-05 PROCEDURE — 37000009 HC ANESTHESIA EA ADD 15 MINS: Performed by: ORTHOPAEDIC SURGERY

## 2022-05-05 PROCEDURE — 71000033 HC RECOVERY, INTIAL HOUR: Performed by: ORTHOPAEDIC SURGERY

## 2022-05-05 PROCEDURE — 63600175 PHARM REV CODE 636 W HCPCS: Performed by: ORTHOPAEDIC SURGERY

## 2022-05-05 PROCEDURE — 37000008 HC ANESTHESIA 1ST 15 MINUTES: Performed by: ORTHOPAEDIC SURGERY

## 2022-05-05 PROCEDURE — 36000711: Performed by: ORTHOPAEDIC SURGERY

## 2022-05-05 PROCEDURE — 25000003 PHARM REV CODE 250: Performed by: NURSE ANESTHETIST, CERTIFIED REGISTERED

## 2022-05-05 PROCEDURE — 71000015 HC POSTOP RECOV 1ST HR: Performed by: ORTHOPAEDIC SURGERY

## 2022-05-05 PROCEDURE — 71000016 HC POSTOP RECOV ADDL HR: Performed by: ORTHOPAEDIC SURGERY

## 2022-05-05 PROCEDURE — 27201423 OPTIME MED/SURG SUP & DEVICES STERILE SUPPLY: Performed by: ORTHOPAEDIC SURGERY

## 2022-05-05 PROCEDURE — 63600175 PHARM REV CODE 636 W HCPCS: Performed by: NURSE ANESTHETIST, CERTIFIED REGISTERED

## 2022-05-05 PROCEDURE — P9045 ALBUMIN (HUMAN), 5%, 250 ML: HCPCS | Mod: JG | Performed by: NURSE ANESTHETIST, CERTIFIED REGISTERED

## 2022-05-05 PROCEDURE — C1713 ANCHOR/SCREW BN/BN,TIS/BN: HCPCS | Performed by: ORTHOPAEDIC SURGERY

## 2022-05-05 DEVICE — ANCHOR FIBERTAK SOFT 2.6: Type: IMPLANTABLE DEVICE | Site: SHOULDER | Status: FUNCTIONAL

## 2022-05-05 DEVICE — KIT FIBERTAK ANCHR DRILL 1.9MM: Type: IMPLANTABLE DEVICE | Site: SHOULDER | Status: FUNCTIONAL

## 2022-05-05 DEVICE — ANCHOR BIOCOMP SWVLLOK: Type: IMPLANTABLE DEVICE | Site: SHOULDER | Status: FUNCTIONAL

## 2022-05-05 RX ORDER — FENTANYL CITRATE 50 UG/ML
INJECTION, SOLUTION INTRAMUSCULAR; INTRAVENOUS
Status: DISCONTINUED | OUTPATIENT
Start: 2022-05-05 | End: 2022-05-05

## 2022-05-05 RX ORDER — HYDROMORPHONE HYDROCHLORIDE 2 MG/ML
0.4 INJECTION, SOLUTION INTRAMUSCULAR; INTRAVENOUS; SUBCUTANEOUS EVERY 5 MIN PRN
Status: DISCONTINUED | OUTPATIENT
Start: 2022-05-05 | End: 2022-05-05 | Stop reason: HOSPADM

## 2022-05-05 RX ORDER — LIDOCAINE HYDROCHLORIDE 10 MG/ML
0.5 INJECTION, SOLUTION EPIDURAL; INFILTRATION; INTRACAUDAL; PERINEURAL ONCE
Status: ACTIVE | OUTPATIENT
Start: 2022-05-05

## 2022-05-05 RX ORDER — OXYCODONE HYDROCHLORIDE 5 MG/1
5 TABLET ORAL ONCE
Status: COMPLETED | OUTPATIENT
Start: 2022-05-05 | End: 2022-05-05

## 2022-05-05 RX ORDER — SODIUM CHLORIDE 0.9 % (FLUSH) 0.9 %
3 SYRINGE (ML) INJECTION
Status: DISCONTINUED | OUTPATIENT
Start: 2022-05-05 | End: 2022-05-05 | Stop reason: HOSPADM

## 2022-05-05 RX ORDER — MINOCYCLINE HYDROCHLORIDE 100 MG/1
100 CAPSULE ORAL EVERY 12 HOURS
Qty: 42 CAPSULE | Refills: 0 | Status: SHIPPED | OUTPATIENT
Start: 2022-05-05 | End: 2022-10-27 | Stop reason: CLARIF

## 2022-05-05 RX ORDER — PROPOFOL 10 MG/ML
VIAL (ML) INTRAVENOUS
Status: DISCONTINUED | OUTPATIENT
Start: 2022-05-05 | End: 2022-05-05

## 2022-05-05 RX ORDER — MIDAZOLAM HYDROCHLORIDE 1 MG/ML
INJECTION INTRAMUSCULAR; INTRAVENOUS
Status: DISCONTINUED | OUTPATIENT
Start: 2022-05-05 | End: 2022-05-05

## 2022-05-05 RX ORDER — ALBUMIN HUMAN 50 G/1000ML
SOLUTION INTRAVENOUS CONTINUOUS PRN
Status: DISCONTINUED | OUTPATIENT
Start: 2022-05-05 | End: 2022-05-05

## 2022-05-05 RX ORDER — BUPIVACAINE HYDROCHLORIDE 2.5 MG/ML
INJECTION, SOLUTION EPIDURAL; INFILTRATION; INTRACAUDAL
Status: DISCONTINUED | OUTPATIENT
Start: 2022-05-05 | End: 2022-05-05 | Stop reason: HOSPADM

## 2022-05-05 RX ORDER — HYDROCODONE BITARTRATE AND ACETAMINOPHEN 10; 325 MG/1; MG/1
1 TABLET ORAL
Qty: 40 TABLET | Refills: 0 | Status: SHIPPED | OUTPATIENT
Start: 2022-05-05 | End: 2022-10-27 | Stop reason: CLARIF

## 2022-05-05 RX ORDER — EPINEPHRINE 0.1 MG/ML
INJECTION INTRAVENOUS
Status: DISCONTINUED | OUTPATIENT
Start: 2022-05-05 | End: 2022-05-05

## 2022-05-05 RX ORDER — MUPIROCIN 20 MG/G
OINTMENT TOPICAL 2 TIMES DAILY
Status: DISCONTINUED | OUTPATIENT
Start: 2022-05-05 | End: 2022-05-05 | Stop reason: HOSPADM

## 2022-05-05 RX ORDER — ONDANSETRON 2 MG/ML
INJECTION INTRAMUSCULAR; INTRAVENOUS
Status: DISCONTINUED | OUTPATIENT
Start: 2022-05-05 | End: 2022-05-05

## 2022-05-05 RX ORDER — CLINDAMYCIN PHOSPHATE 900 MG/50ML
900 INJECTION, SOLUTION INTRAVENOUS ONCE
Status: COMPLETED | OUTPATIENT
Start: 2022-05-05 | End: 2022-05-05

## 2022-05-05 RX ORDER — MEPERIDINE HYDROCHLORIDE 25 MG/ML
12.5 INJECTION INTRAMUSCULAR; INTRAVENOUS; SUBCUTANEOUS ONCE AS NEEDED
Status: DISCONTINUED | OUTPATIENT
Start: 2022-05-05 | End: 2022-05-05 | Stop reason: HOSPADM

## 2022-05-05 RX ORDER — DIPHENHYDRAMINE HYDROCHLORIDE 50 MG/ML
25 INJECTION INTRAMUSCULAR; INTRAVENOUS EVERY 6 HOURS PRN
Status: DISCONTINUED | OUTPATIENT
Start: 2022-05-05 | End: 2022-05-05 | Stop reason: HOSPADM

## 2022-05-05 RX ORDER — ROPIVACAINE HYDROCHLORIDE 5 MG/ML
INJECTION, SOLUTION EPIDURAL; INFILTRATION; PERINEURAL
Status: COMPLETED | OUTPATIENT
Start: 2022-05-05 | End: 2022-05-05

## 2022-05-05 RX ORDER — ROCURONIUM BROMIDE 10 MG/ML
INJECTION, SOLUTION INTRAVENOUS
Status: DISCONTINUED | OUTPATIENT
Start: 2022-05-05 | End: 2022-05-05

## 2022-05-05 RX ORDER — EPINEPHRINE 1 MG/ML
INJECTION, SOLUTION INTRACARDIAC; INTRAMUSCULAR; INTRAVENOUS; SUBCUTANEOUS
Status: DISCONTINUED | OUTPATIENT
Start: 2022-05-05 | End: 2022-05-05 | Stop reason: HOSPADM

## 2022-05-05 RX ORDER — CYCLOBENZAPRINE HCL 5 MG
10 TABLET ORAL ONCE
Status: COMPLETED | OUTPATIENT
Start: 2022-05-05 | End: 2022-05-05

## 2022-05-05 RX ORDER — SODIUM CHLORIDE, SODIUM LACTATE, POTASSIUM CHLORIDE, CALCIUM CHLORIDE 600; 310; 30; 20 MG/100ML; MG/100ML; MG/100ML; MG/100ML
INJECTION, SOLUTION INTRAVENOUS CONTINUOUS
Status: ACTIVE | OUTPATIENT
Start: 2022-05-05

## 2022-05-05 RX ORDER — PROCHLORPERAZINE EDISYLATE 5 MG/ML
5 INJECTION INTRAMUSCULAR; INTRAVENOUS EVERY 30 MIN PRN
Status: DISCONTINUED | OUTPATIENT
Start: 2022-05-05 | End: 2022-05-05 | Stop reason: HOSPADM

## 2022-05-05 RX ORDER — OXYCODONE HYDROCHLORIDE 5 MG/1
5 TABLET ORAL
Status: DISCONTINUED | OUTPATIENT
Start: 2022-05-05 | End: 2022-05-05 | Stop reason: HOSPADM

## 2022-05-05 RX ORDER — ONDANSETRON 4 MG/1
8 TABLET, ORALLY DISINTEGRATING ORAL EVERY 8 HOURS PRN
Qty: 10 TABLET | Refills: 1 | Status: SHIPPED | OUTPATIENT
Start: 2022-05-05 | End: 2022-10-27 | Stop reason: CLARIF

## 2022-05-05 RX ORDER — LIDOCAINE HCL/PF 100 MG/5ML
SYRINGE (ML) INTRAVENOUS
Status: DISCONTINUED | OUTPATIENT
Start: 2022-05-05 | End: 2022-05-05

## 2022-05-05 RX ORDER — VASOPRESSIN 20 [USP'U]/ML
INJECTION, SOLUTION INTRAMUSCULAR; SUBCUTANEOUS
Status: DISCONTINUED | OUTPATIENT
Start: 2022-05-05 | End: 2022-05-05

## 2022-05-05 RX ORDER — EPHEDRINE SULFATE 50 MG/ML
INJECTION, SOLUTION INTRAVENOUS
Status: DISCONTINUED | OUTPATIENT
Start: 2022-05-05 | End: 2022-05-05

## 2022-05-05 RX ADMIN — PROPOFOL 80 MG: 10 INJECTION, EMULSION INTRAVENOUS at 07:05

## 2022-05-05 RX ADMIN — MIDAZOLAM HYDROCHLORIDE 2 MG: 1 INJECTION, SOLUTION INTRAMUSCULAR; INTRAVENOUS at 06:05

## 2022-05-05 RX ADMIN — OXYCODONE 5 MG: 5 TABLET ORAL at 09:05

## 2022-05-05 RX ADMIN — OXYCODONE 5 MG: 5 TABLET ORAL at 11:05

## 2022-05-05 RX ADMIN — HYDROMORPHONE HYDROCHLORIDE 0.4 MG: 2 INJECTION INTRAMUSCULAR; INTRAVENOUS; SUBCUTANEOUS at 09:05

## 2022-05-05 RX ADMIN — CYCLOBENZAPRINE HYDROCHLORIDE 10 MG: 5 TABLET, FILM COATED ORAL at 09:05

## 2022-05-05 RX ADMIN — GLYCOPYRROLATE 0.2 MG: 0.2 INJECTION, SOLUTION INTRAMUSCULAR; INTRAVITREAL at 07:05

## 2022-05-05 RX ADMIN — VASOPRESSIN 1 UNITS: 20 INJECTION, SOLUTION INTRAMUSCULAR; SUBCUTANEOUS at 07:05

## 2022-05-05 RX ADMIN — CALCIUM CHLORIDE 0.5 G: 100 INJECTION, SOLUTION INTRAVENOUS at 07:05

## 2022-05-05 RX ADMIN — LIDOCAINE HYDROCHLORIDE 100 MG: 20 INJECTION, SOLUTION INTRAVENOUS at 07:05

## 2022-05-05 RX ADMIN — CLINDAMYCIN PHOSPHATE 900 MG: 18 INJECTION, SOLUTION INTRAVENOUS at 07:05

## 2022-05-05 RX ADMIN — SODIUM CHLORIDE 100 MG: 9 INJECTION, SOLUTION INTRAVENOUS at 07:05

## 2022-05-05 RX ADMIN — CARBOXYMETHYLCELLULOSE SODIUM 2 DROP: 2.5 SOLUTION/ DROPS OPHTHALMIC at 07:05

## 2022-05-05 RX ADMIN — EPINEPHRINE 0.1 MCG/KG/MIN: 1 INJECTION INTRAMUSCULAR; INTRAVENOUS; SUBCUTANEOUS at 07:05

## 2022-05-05 RX ADMIN — EPINEPHRINE 0.2 MCG: 0.1 INJECTION, SOLUTION ENDOTRACHEAL; INTRACARDIAC; INTRAVENOUS at 07:05

## 2022-05-05 RX ADMIN — SODIUM CHLORIDE, SODIUM LACTATE, POTASSIUM CHLORIDE, AND CALCIUM CHLORIDE: .6; .31; .03; .02 INJECTION, SOLUTION INTRAVENOUS at 08:05

## 2022-05-05 RX ADMIN — ALBUMIN (HUMAN): 12.5 SOLUTION INTRAVENOUS at 07:05

## 2022-05-05 RX ADMIN — SODIUM CHLORIDE, SODIUM LACTATE, POTASSIUM CHLORIDE, AND CALCIUM CHLORIDE: .6; .31; .03; .02 INJECTION, SOLUTION INTRAVENOUS at 06:05

## 2022-05-05 RX ADMIN — ROCURONIUM BROMIDE 50 MG: 10 SOLUTION INTRAVENOUS at 07:05

## 2022-05-05 RX ADMIN — ONDANSETRON HYDROCHLORIDE 4 MG: 2 INJECTION INTRAMUSCULAR; INTRAVENOUS at 07:05

## 2022-05-05 RX ADMIN — ROPIVACAINE HYDROCHLORIDE 30 ML: 5 INJECTION, SOLUTION EPIDURAL; INFILTRATION; PERINEURAL at 06:05

## 2022-05-05 RX ADMIN — EPHEDRINE SULFATE 20 MG: 50 INJECTION INTRAVENOUS at 07:05

## 2022-05-05 RX ADMIN — VASOPRESSIN 2 UNITS: 20 INJECTION, SOLUTION INTRAMUSCULAR; SUBCUTANEOUS at 07:05

## 2022-05-05 RX ADMIN — MIDAZOLAM HYDROCHLORIDE 2 MG: 1 INJECTION, SOLUTION INTRAMUSCULAR; INTRAVENOUS at 07:05

## 2022-05-05 RX ADMIN — FENTANYL CITRATE 100 MCG: 50 INJECTION, SOLUTION INTRAMUSCULAR; INTRAVENOUS at 06:05

## 2022-05-05 RX ADMIN — SUGAMMADEX 200 MG: 100 INJECTION, SOLUTION INTRAVENOUS at 08:05

## 2022-05-05 RX ADMIN — ROCURONIUM BROMIDE 20 MG: 10 SOLUTION INTRAVENOUS at 08:05

## 2022-05-05 RX ADMIN — EPHEDRINE SULFATE 10 MG: 50 INJECTION INTRAVENOUS at 07:05

## 2022-05-05 RX ADMIN — VASOPRESSIN 1 UNITS: 20 INJECTION, SOLUTION INTRAMUSCULAR; SUBCUTANEOUS at 08:05

## 2022-05-05 NOTE — OR NURSING
in room to speak with patient. Officer showed the picture of the patient walking in the [facility] without a watch on either wrist to patient. Patient agrees with discharge and discharged to the 1st floor by wheelchair. Patient assisted into his friends car by [facility].

## 2022-05-05 NOTE — ANESTHESIA PROCEDURE NOTES
Peripheral Block    Patient location during procedure: pre-op   Block not for primary anesthetic.  Reason for block: at surgeon's request and post-op pain management   Post-op Pain Location: right shoulder pain   Timeout: 5/5/2022 6:40 AM     Staffing  Authorizing Provider: Rajesh Crain MD  Performing Provider: Rajesh Crain MD    Preanesthetic Checklist  Completed: patient identified, IV checked, site marked, risks and benefits discussed, surgical consent, monitors and equipment checked, pre-op evaluation and timeout performed  Peripheral Block  Patient position: sitting  Prep: ChloraPrep  Patient monitoring: heart rate, cardiac monitor, continuous pulse ox, continuous capnometry and frequent blood pressure checks  Block type: interscalene  Laterality: right  Injection technique: single shot  Needle  Needle type: Stimuplex   Needle gauge: 22 G  Needle length: 2 in  Needle localization: anatomical landmarks and ultrasound guidance   -ultrasound image captured on disc.  Assessment  Injection assessment: negative aspiration, negative parasthesia and local visualized surrounding nerve  Paresthesia pain: none  Heart rate change: no  Slow fractionated injection: yes  Pain Tolerance: comfortable throughout block and no complaints  Medications:    Medications: ropivacaine (NAROPIN) injection 0.5% - Perineural   30 mL - 5/5/2022 6:40:00 AM    Additional Notes  VSS.  [facility] RN monitoring vitals throughout procedure.  Patient tolerated procedure well.

## 2022-05-05 NOTE — TRANSFER OF CARE
"Anesthesia Transfer of Care Note    Patient: Ernesto Casper    Procedure(s) Performed: Procedure(s) (LRB):  ARTHROSCOPY, SHOULDER (Right)  REPAIR, ROTATOR CUFF (Right)  FIXATION, TENDON / BICEPS TENODESIS (Right)  ARTHROSCOPY, SHOULDER, WITH SUBACROMIAL SPACE DECOMPRESSION (Right)  REMOVAL, FOREIGN BODY (Right)    Patient location: PACU    Anesthesia Type: general    Transport from OR: Transported from OR on 6-10 L/min O2 by face mask with adequate spontaneous ventilation    Post pain: adequate analgesia    Post assessment: no apparent anesthetic complications    Post vital signs: stable    Level of consciousness: sedated    Nausea/Vomiting: no nausea/vomiting    Complications: none    Transfer of care protocol was followed      Last vitals:   Visit Vitals  BP (!) 153/78 (BP Location: Left arm, Patient Position: Lying)   Pulse 83   Temp 36.4 °C (97.5 °F) (Temporal)   Resp 18   Ht 5' 8" (1.727 m)   Wt 93 kg (205 lb)   SpO2 100%   BMI 31.17 kg/m²     "

## 2022-05-05 NOTE — PLAN OF CARE
"Pt states that he had a watch on upon arrival to hospital, but that the watch was not present in his bag of personal belongings post op.  States that he "just bought it yesterday"  Pt called friend that accompanied him this am and friend states that he does not have the watch in his possession.  Security footage at the time  pt's arrival in the building shows no watch on either arm.  Pt asked if he is sure he had it on this am and he states that he believes he did.  NELA Calderon and ISHAAN Danielson notified.  "

## 2022-05-05 NOTE — ANESTHESIA PROCEDURE NOTES
Intubation    Date/Time: 5/5/2022 7:09 AM  Performed by: Luann Jay CRNA  Authorized by: Rajesh Crain MD     Intubation:     Induction:  Intravenous    Mask Ventilation:  Easy with oral airway    Attempts:  1    Attempted By:  Student    Method of Intubation:  Video laryngoscopy    Blade:  Geronimo 3    Laryngeal View Grade: Grade I - full view of cords      Difficult Airway Encountered?: Yes      Future Airway Recommendations:  Excess redundant tissue. Recommend Geronimo 4    Complications:  None    Airway Device:  Oral endotracheal tube    Airway Device Size:  8.0    Style/Cuff Inflation:  Cuffed (inflated to minimal occlusive pressure)    Tube secured:  23    Secured at:  The teeth    Placement Verified By:  Capnometry    Complicating Factors:  Short neck, large/floppy epiglottis and oropharyngeal edema or fat    Findings Post-Intubation:  Atraumatic/condition of teeth unchanged

## 2022-05-05 NOTE — PLAN OF CARE
Pt is accompanied today by his / friend Emmanuel who will be bring him home.  Pt is highly anxious today about undergoing anesthesia as he developed complications intraoperatively with previous surgery which required the procedure to be aborted. Pt underwent cardiac clearance at [facility], copy in blue chart.  Active listening utelized and reassurance provided.  Pt was encouraged to speak with anesthesiology team in holding and be sure to relay all his concerns at that time.  Pt also expressed deep concern about post-op pain control once his regional block wears off.  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  In his previous surgeries, he required PO Dilaudid to alleviate his pain.  Pt stated he has mentioned his pain control concerns with Dr. Shaffer in the office.  A sticky note was placed on the front of pt's blue chart to alert Dr. Shaffer to speak with the patient about his concerns and the patient was also encouraged to relay concerns once again to Dr. Shaffer in holding.  I spoke with CEHTNA Zacarias in holding and told her patient had a high level of anxiety this morning, which is reflected in his BP.

## 2022-05-05 NOTE — ANESTHESIA PROCEDURE NOTES
Arterial    Diagnosis: hypotension  Doctor requesting consult: jovita    Patient location during procedure: done in OR  Timeout: 5/5/2022 8:00 AM    Staffing  Authorizing Provider: Rajesh Crain MD  Performing Provider: Rajesh Crain MD    Anesthesiologist was present at the time of the procedure.    Preanesthetic Checklist  Completed: patient identified, IV checked, site marked, risks and benefits discussed, surgical consent, monitors and equipment checked, pre-op evaluation, timeout performed and anesthesia consent givenArterial  Skin Prep: chlorhexidine gluconate  Orientation: left  Location: radial    Catheter Size: 20 G  Catheter placement by Ultrasound guidance. Heme positive aspiration all ports.   Vessel Caliber: medium, patent, compressibility normal  Vascular Doppler:  not done  Needle advanced into vessel with real time Ultrasound guidance.Insertion Attempts: 1  Assessment  Dressing: secured with tape and tegaderm  Patient: Tolerated well

## 2022-05-05 NOTE — DISCHARGE INSTRUCTIONS
Select on Hyperlink below to print updated instructions from [email or web address]  Shoulder Sling Shot 2 Breg Brace           Your Surgeon Gave You EXPAREL® (bupivacaine liposome injectable suspension)    To help control your pain after surgery, your surgeon injected EXPAREL into your surgical incision just before the end of the procedure.   EXPAREL is a local analgesic that contains the local anesthetic bupivacaine. Local anesthetics provide pain relief by numbing the tissue  around the surgical site.   EXPAREL is specifically designed to release pain medication over time and can control pain for up to 72 hours.   In addition to EXPAREL, your surgeon may provide other pain medications to control your pain.   Each patient is different and responds differently to painmedication. Depending on how you respond to EXPAREL, you may require less  additional pain medication during your recovery.    Possible Side Effects    Side effects can occur with any medication and it is important not to ignore anything you may be experiencing. Some patients who  received EXPAREL experienced nausea, vomiting, or constipation. Rarely, patients who receive bupivacaine (the active ingredient in  EXPAREL) have experienced numbness and tingling in their mouth or lips, lightheadedness, or anxiety. Speak with your doctor right  away if you think you may be experiencing any of these sensations, or if you have other questions regarding possible side effects.    Your Recovery    When your pain is under control, your body can better focus on healing. This is not the time to test your pain tolerance, or grin and  bear it.Work with your surgeon and nurse to make your recovery as speedy and pain-free as possible.   Follow the post-op orders your nurse gave you.   Eat a healthy diet and drink plenty of water. Surgery stresses your body; your body responds by needing more energy to heal.      Important Information About EXPAREL  Products that contain  bupivacaine, like EXPAREL, may cause a temporary loss of  sensation or the ability to move in the area where bupivacaine was injected.    Date Administered: 5/5/22  Time Administered: 0848    Other Forms of Bupivicaine should not be administered within 96hrs following administration of EXPAREL.  [facility] Care Cube Instructions

## 2022-05-05 NOTE — PLAN OF CARE
Ernesto Casper has met all discharge criteria from Phase II. Vital Signs are stable, ambulating  without difficulty.Pain is now under control and tolerable for the pt. Pain score 5 at this time.  Discharge instructions given, patient verbalized understanding. Discharged from facility via wheelchair in stable condition.

## 2022-05-05 NOTE — OP NOTE
[facility]  Surgery Department  Operative Note    SUMMARY     Date of Procedure: 5/5/2022     Procedures:  1. Right shoulder arthroscopic rotator cuff repair (subscapularis + supraspinatus)  2. Right shoulder arthroscopic subacromial decompression (acromioplasty, CA ligament recession, bursectomy, lateral acromial bevelling)  3. Right shoulder arthroscopic foreign body removal  4. Right shoulder subpectoral biceps tenodesis    Surgeon(s) and Role:     * Donnell Shaffer MD - Primary    Assistant: Duncan ROACH    Pre-Operative Diagnosis:   1. Right shoulder partial-thickness subscapularis tear  2. Right shoulder full-thickness supraspinatus tear  3. Foreign body right shoulder  4. Right partial-thickness long head biceps tear  5. Right shoulder impingement syndrome    Post-Operative Diagnosis:   same    Anesthesia: General + regional + local    Technical Procedures Used: Mr. Casper was brought to the operating room 05/05/2022 for planned right shoulder arthroscopy.  He was given 900 mg of clindamycin as well as a regional nerve block by the anesthesia service preoperatively.  He was brought to the operating room and placed in the supine position.  General tracheal anesthesia was administered.  Examination under anesthesia revealed full passive motion with no laxity.  He was then placed in the lateral decubitus position with all bony prominences padded appropriately with an axillary roll and a shoulder suspension device.  The right shoulder was prepped and draped in the usual sterile fashion a time-out was performed.  The bony landmarks were marked with a surgical marking pen and 30 cc of normal saline were injected into the glenohumeral joint aid in capsular distention.  A standard posterior portal was then established.  Diagnostic arthroscopy then proceeded.  There were 2 prior sutures in the superior/anterior superior labral area from looked like a prior labral repair.  The long head  biceps was partially torn was some inflammatory changes.  There is no significant tearing of the superior labrum.  There was some degenerative fraying of the inferior labrum.  There is no significant cartilage lesions of the humeral head or glenoid.  There was a full-thickness supraspinatus tear.  There was high-grade partial-thickness tear of the upper 1/3 of the subscapularis.    An anterior portal was established through the rotator interval with spinal needle localization.  The shaver was used to debride the interval as well as debride the frayed edges of the subscapularis.  An upper 1/3 subscapularis repair was then performed.  Two FiberLink sutures were docked into a 4.75 mm SwiveLock to turn down the torn a superior edge of the subscapularis.  A nice tension-free repair was achieved.  The previous sutures were removed.  Spinal needle was placed through the supraspinatus defect and 0 PDS suture was shuttled marking the location from inspection from the bursal side.    The scope was then placed in the subacromial space.  Some chronic appearing bursal tissue was debrided with a shaver.  A lateral portal was established with spinal needle localization.  The shaver was used to debride the obvious full-thickness crescent type tear.  The anterior rotator cable was intact.  The shaver was used to debride soft tissue off the greater tuberosity.  After bleeding bony bed was achieved an accessory lateral portal was established and an Arthrex 2.6 mm triple loaded FiberTak anchor was placed at the articular margin.  All 6 sutures were passed in a horizontal mattress fashion starting anteriorly and working posteriorly.  Sliding knots were then tied getting a nice tension-free repair with no dog ears.  Two additional FiberLink sutures were placed to capture all of the tissue.  Two lateral row 4.75 mm SwiveLock anchors were then placed, 5 sutures in the posterior/lateral SwiveLock in 3 sutures in the anterior/lateral  Jovon.  A nice tension-free repair was achieved.  All remaining bursal tissue was removed.    A subacromial decompression was then performed.  A prior acromioplasty was seen but there was still abnormal morphology to the anterior/lateral acromion.  A bur was used to perform acromioplasty to a type 1 morphology.  The shaver was then used to remove any soft tissue or bony debris.  Final arthroscopic pictures were taken.  The portals were then closed with 3-0 Prolene suture.    The arm was taken out of suspension and a 2nd Ioban was wrapped around the hand to maintain sterility.  The axilla was exposed.  A 4 cm incision was then made in the axilla.  The fascia between the pectoralis major and the conjoint tendon was incised the long head biceps was read retrieved out of the wound.  The proximal portion was trimmed to the appropriate length.  The Arthrex double loaded FiberTak anchor was then placed in the subpectoral location.  Locking Krackow sutures were placed in the opposing sutures were used to reduce the tendon down to bone.  The posterior were then passed back through the tendon and everything was tied down nicely.  No excessive tension was noted.  The wound was then copiously irrigated.  The fascia and subcutaneous tissue was closed with 2-0 Vicryl followed by Monocryl for the skin.    A soft dressing was applied followed by a polar Care unit.  An abduction brace was placed.  The patient was then extubated in the operating room and taken to the PACU without complication    Description of the Findings of the Procedure:  See dictation    Significant Surgical Tasks Conducted by the Assistant(s), if Applicable:  Positioning, prepping, draping, camera, instrumentation, closure, bandage, brace    Complications: No    Estimated Blood Loss (EBL): 10cc           Implants:   Implant Name Type Inv. Item Serial No.  Lot No. LRB No. Used Action   ANCHOR BIOCOMP SWVLLOK - [id number]  ANCHOR BIOCOMP SWVLLOK   ARTHREX [id number] Right 1 Implanted   ANCHOR FIBERTAK SOFT 2.6 - [id number]  ANCHOR FIBERTAK SOFT 2.6 [id number] ARTHREX  Right 1 Implanted   ANCHOR BIOCOMP SWVLLOK - [id number]  ANCHOR BIOCOMP SWVLLOK [id number] ARTHREX  Right 2 Implanted   KIT FIBERTAK ANCHR DRILL 1.9MM - [id number]  KIT FIBERTAK ANCHR DRILL 1.9MM  ARTHREX [id number] Right 1 Implanted       Specimens:   Specimen (24h ago, onward)            None                  Condition: Good    Disposition: PACU - hemodynamically stable.    Attestation: I was present and scrubbed for the entire procedure.    Discharge Note    SUMMARY     Admit Date: 5/5/2022    Discharge Date and Time:  05/05/2022 9:31 AM    Hospital Course (synopsis of major diagnoses, care, treatment, and services provided during the course of the hospital stay): outpt     Final Diagnosis: Post-Op Diagnosis Codes:     * Complete tear of right rotator cuff, unspecified whether traumatic [M75.121]     * Superior glenoid labrum lesion of right shoulder, initial encounter [S43.431A]     * Impingement syndrome of right shoulder [M75.41]    Disposition: Home or Self Care    Follow Up/Patient Instructions:     Medications:  Reconciled Home Medications:      Medication List      START taking these medications    minocycline 100 MG capsule  Commonly known as: MINOCIN,DYNACIN  Take 1 capsule (100 mg total) by mouth every 12 (twelve) hours.        CHANGE how you take these medications    * HYDROcodone-acetaminophen  mg per tablet  Commonly known as: NORCO  Take 1 tablet by mouth daily as needed.  What changed:   · Another medication with the same name was added. Make sure you understand how and when to take each.  · Another medication with the same name was removed. Continue taking this medication, and follow the directions you see here.     * HYDROcodone-acetaminophen  mg per tablet  Commonly known as: NORCO  Take 1 tablet by mouth every 4 to 6 hours as needed for Pain (pain).  What changed: You  were already taking a medication with the same name, and this prescription was added. Make sure you understand how and when to take each.  Replaces: HYDROcodone-acetaminophen 5-325 mg per tablet         * This list has 2 medication(s) that are the same as other medications prescribed for you. Read the directions carefully, and ask your doctor or other care provider to review them with you.            CONTINUE taking these medications    5-HTP ORAL  Take by mouth. 1 capsule By mouth every day     ALPRAZolam 0.5 MG tablet  Commonly known as: XANAX  Take 0.5 mg by mouth 2 (two) times daily as needed.     b complex vitamins tablet  Take by mouth. 1 Tablet Oral Every day     busPIRone 7.5 MG tablet  Commonly known as: BUSPAR  Take 7.5 mg by mouth 3 (three) times daily.     fluticasone propionate 50 mcg/actuation nasal spray  Commonly known as: FLONASE  INSTILL 2 SPRAYS DAILY INTO EACH NOSTRIL     gabapentin 300 MG capsule  Commonly known as: NEURONTIN  Take 300 mg by mouth 3 (three) times daily.     ondansetron 4 MG Tbdl  Commonly known as: ZOFRAN-ODT  Dissolve 2 tablets (8 mg total) by mouth every 8 (eight) hours as needed (nausea).     phenylephrine HCL 0.5% 0.5 % nasal spray  Commonly known as: SANDRA-SYNEPHRINE  1 drop by Nasal route every 4 (four) hours as needed for Congestion.     sildenafiL 25 MG tablet  Commonly known as: VIAGRA  Take 1-4 tablets ( mg total) by mouth as needed for Erectile Dysfunction.     tiZANidine 4 MG tablet  Commonly known as: ZANAFLEX  Take 1 tablet by mouth 2 (two) times daily as needed.     zolpidem 10 mg Tab  Commonly known as: AMBIEN  Take 10 mg by mouth nightly as needed.        STOP taking these medications    HYDROcodone-acetaminophen 5-325 mg per tablet  Commonly known as: NORCO  Replaced by: HYDROcodone-acetaminophen  mg per tablet  You also have another medication with the same name that you need to continue taking as instructed.          Discharge Procedure Orders    Diet general     Call MD for:  temperature >100.4     Call MD for:  persistent nausea and vomiting     Call MD for:  severe uncontrolled pain     Call MD for:  difficulty breathing, headache or visual disturbances     Call MD for:  redness, tenderness, or signs of infection (pain, swelling, redness, odor or green/yellow discharge around incision site)     Call MD for:  hives     Call MD for:  persistent dizziness or light-headedness     Call MD for:  extreme fatigue     Ice to affected area     Keep surgical extremity elevated     Lifting restrictions     Remove dressing in 72 hours   Order Comments: Then change daily.  Keep clean, dry and covered      Follow-up Information     Donnell Bueno MD. Schedule an appointment as soon as possible for a visit in 10 day(s).    Specialty: Orthopedic Surgery  Why: For wound re-check, For suture removal  Contact information:  [address] [facility] [address] [phone]

## 2022-05-05 NOTE — ANESTHESIA POSTPROCEDURE EVALUATION
Anesthesia Post Evaluation    Patient: Ernesto Casper    Procedure(s) Performed: Procedure(s) (LRB):  ARTHROSCOPY, SHOULDER (Right)  REPAIR, ROTATOR CUFF (Right)  FIXATION, TENDON / BICEPS TENODESIS (Right)  ARTHROSCOPY, SHOULDER, WITH SUBACROMIAL SPACE DECOMPRESSION (Right)  REMOVAL, FOREIGN BODY (Right)    Final Anesthesia Type: general      Patient location during evaluation: PACU  Patient participation: Yes- Able to Participate  Level of consciousness: awake and alert  Post-procedure vital signs: reviewed and stable  Pain management: adequate  Airway patency: patent  INA mitigation strategies: Extubation while patient is awake  PONV status at discharge: No PONV  Anesthetic complications: no      Cardiovascular status: hemodynamically stable  Respiratory status: unassisted  Hydration status: euvolemic  Follow-up not needed.          Vitals Value Taken Time   /78 05/05/22 1125   Temp 36.6 °C (97.9 °F) 05/05/22 1025   Pulse 92 05/05/22 1125   Resp 20 05/05/22 1125   SpO2 98 % 05/05/22 1125         Event Time   Out of Recovery 10:21:34         Pain/Trevon Score: Pain Rating Prior to Med Admin: 6 (5/5/2022 11:13 AM)  Pain Rating Post Med Admin: 5 (5/5/2022 11:53 AM)  Trevon Score: 10 (5/5/2022 11:25 AM)

## 2022-05-06 VITALS
HEIGHT: 68 IN | HEART RATE: 92 BPM | WEIGHT: 205 LBS | SYSTOLIC BLOOD PRESSURE: 142 MMHG | TEMPERATURE: 98 F | BODY MASS INDEX: 31.07 KG/M2 | OXYGEN SATURATION: 98 % | DIASTOLIC BLOOD PRESSURE: 78 MMHG | RESPIRATION RATE: 20 BRPM

## 2022-10-24 ENCOUNTER — ANESTHESIA EVENT (OUTPATIENT)
Dept: SURGERY | Facility: OTHER | Age: 47
End: 2022-10-24
Payer: COMMERCIAL

## 2022-10-25 NOTE — ANESTHESIA PREPROCEDURE EVALUATION
10/27/2022  Ernesto Casper is a 47 y.o., male.      Pre-op Assessment    I have reviewed the Patient Summary Reports.     I have reviewed the Nursing Notes. I have reviewed the NPO Status.   I have reviewed the Medications.     Review of Systems  Anesthesia Hx:  Denies Family Hx of Anesthesia complications.   Denies Personal Hx of Anesthesia complications.   Social:  Non-Smoker    Hematology/Oncology:  Hematology Normal   Oncology Normal     EENT/Dental:EENT/Dental Normal   Cardiovascular:   Denies Hypertension.  180/102 in clinic, states when not stressed runs normal   Pulmonary:  Pulmonary Normal    Renal/:  Renal/ Normal     Hepatic/GI:   GERD (diet rx)    Musculoskeletal:   Arthritis (berna shoulders)     Neurological:   Chronic Pain Syndrome (daily norco, neurontin)   Endocrine:  Endocrine Normal    Dermatological:  Skin Normal    Psych:   anxiety          Physical Exam  General: Cooperative, Alert and Oriented    Airway:  Mallampati: III   Mouth Opening: Normal  TM Distance: Normal  Neck ROM: Normal ROM  Neck: Girth Increased    Dental:  Intact        Anesthesia Plan  Type of Anesthesia, risks & benefits discussed:    Anesthesia Type: Gen ETT  Intra-op Monitoring Plan: Standard ASA Monitors  Post Op Pain Control Plan: multimodal analgesia, IV/PO Opioids PRN and peripheral nerve block  Induction:  IV  Airway Plan: Video  Informed Consent: Informed consent signed with the Patient and all parties understand the risks and agree with anesthesia plan.  All questions answered.   ASA Score: 2  Anesthesia Plan Notes: Outside labs obtained    Ready For Surgery From Anesthesia Perspective.     .                                                                                                                 10/27/2022  Ernesto Casper is a 47 y.o., male.      Pre-op Assessment    I have reviewed the  Patient Summary Reports.     I have reviewed the Nursing Notes. I have reviewed the NPO Status.   I have reviewed the Medications.     Review of Systems  Anesthesia Hx:  Denies Family Hx of Anesthesia complications.   Denies Personal Hx of Anesthesia complications.   Social:  Non-Smoker    Hematology/Oncology:  Hematology Normal   Oncology Normal     EENT/Dental:EENT/Dental Normal   Cardiovascular:   Denies Hypertension.  180/102 in clinic, states when not stressed runs normal   Pulmonary:  Pulmonary Normal    Renal/:  Renal/ Normal     Hepatic/GI:   GERD (diet rx)    Musculoskeletal:   Arthritis (berna shoulders)     Neurological:   Chronic Pain Syndrome (daily norco, neurontin)   Endocrine:  Endocrine Normal    Dermatological:  Skin Normal    Psych:   anxiety          Physical Exam  General: Cooperative, Alert and Oriented    Airway:  Mallampati: III   Mouth Opening: Normal  TM Distance: Normal  Neck ROM: Normal ROM  Neck: Girth Increased    Dental:  Intact        Anesthesia Plan  Type of Anesthesia, risks & benefits discussed:    Anesthesia Type: Gen ETT  Intra-op Monitoring Plan: Standard ASA Monitors  Post Op Pain Control Plan: multimodal analgesia, IV/PO Opioids PRN and peripheral nerve block  Induction:  IV  Airway Plan: Video  Informed Consent: Informed consent signed with the Patient and all parties understand the risks and agree with anesthesia plan.  All questions answered.   ASA Score: 2  Anesthesia Plan Notes: Outside labs obtained    Ready For Surgery From Anesthesia Perspective.     .                                                                                                                 10/27/2022  Ernesto Casper is a 47 y.o., male.      Pre-op Assessment    I have reviewed the Patient Summary Reports.     I have reviewed the Nursing Notes. I have reviewed the NPO Status.   I have reviewed the Medications.     Review of Systems  Anesthesia Hx:  Denies Family Hx of Anesthesia  complications.   Denies Personal Hx of Anesthesia complications.   Social:  Non-Smoker    Hematology/Oncology:  Hematology Normal   Oncology Normal     EENT/Dental:EENT/Dental Normal   Cardiovascular:   Denies Hypertension.  180/102 in clinic, states when not stressed runs normal   Pulmonary:  Pulmonary Normal    Renal/:  Renal/ Normal     Hepatic/GI:   GERD (diet rx)    Musculoskeletal:   Arthritis (berna shoulders)     Neurological:   Chronic Pain Syndrome (daily norco, neurontin)   Endocrine:  Endocrine Normal    Dermatological:  Skin Normal    Psych:   anxiety          Physical Exam  General: Cooperative, Alert and Oriented    Airway:  Mallampati: III   Mouth Opening: Normal  TM Distance: Normal  Neck ROM: Normal ROM  Neck: Girth Increased    Dental:  Intact        Anesthesia Plan  Type of Anesthesia, risks & benefits discussed:    Anesthesia Type: Gen ETT  Intra-op Monitoring Plan: Standard ASA Monitors  Post Op Pain Control Plan: multimodal analgesia, IV/PO Opioids PRN and peripheral nerve block  Induction:  IV  Airway Plan: Video  Informed Consent: Informed consent signed with the Patient and all parties understand the risks and agree with anesthesia plan.  All questions answered.   ASA Score: 2  Anesthesia Plan Notes: Outside labs obtained    Ready For Surgery From Anesthesia Perspective.     .                                                                                                                 10/27/2022  Ernesto Casper is a 47 y.o., male.      Pre-op Assessment    I have reviewed the Patient Summary Reports.     I have reviewed the Nursing Notes. I have reviewed the NPO Status.   I have reviewed the Medications.     Review of Systems  Social:  Non-Smoker    Cardiovascular:   Exercise tolerance: good    Pulmonary:  Pulmonary Normal    Hepatic/GI:   GERD, well controlled    Endocrine:  Endocrine Normal    Psych:   Psychiatric History          Physical Exam  General: Well  nourished    Airway:  Mallampati: II   Mouth Opening: Normal  TM Distance: Normal  Tongue: Normal  Neck ROM: Normal ROM    Dental:  Intact        Anesthesia Plan  Type of Anesthesia, risks & benefits discussed:    Anesthesia Type: Gen ETT  Intra-op Monitoring Plan: Standard ASA Monitors  Post Op Pain Control Plan: multimodal analgesia and peripheral nerve block  Induction:  IV  Airway Plan: Video  Informed Consent: Informed consent signed with the Patient and all parties understand the risks and agree with anesthesia plan.  All questions answered.   ASA Score: 2    Ready For Surgery From Anesthesia Perspective.     .                                                                                                                 10/25/2022  Ernesto Casper is a 47 y.o., male.      Pre-op Assessment          Review of Systems          .

## 2022-10-27 ENCOUNTER — HOSPITAL ENCOUNTER (OUTPATIENT)
Dept: PREADMISSION TESTING | Facility: OTHER | Age: 47
Discharge: HOME | End: 2022-10-27
Attending: ORTHOPAEDIC SURGERY
Payer: COMMERCIAL

## 2022-10-27 VITALS
DIASTOLIC BLOOD PRESSURE: 92 MMHG | HEART RATE: 67 BPM | TEMPERATURE: 98 F | WEIGHT: 210 LBS | SYSTOLIC BLOOD PRESSURE: 168 MMHG | BODY MASS INDEX: 31.93 KG/M2 | OXYGEN SATURATION: 98 %

## 2022-10-27 RX ORDER — ACETAMINOPHEN 500 MG
1000 TABLET ORAL
Status: CANCELLED | OUTPATIENT
Start: 2022-10-27 | End: 2022-10-27

## 2022-10-27 RX ORDER — SODIUM CHLORIDE, SODIUM LACTATE, POTASSIUM CHLORIDE, CALCIUM CHLORIDE 600; 310; 30; 20 MG/100ML; MG/100ML; MG/100ML; MG/100ML
INJECTION, SOLUTION INTRAVENOUS CONTINUOUS
Status: CANCELLED | OUTPATIENT
Start: 2022-10-27

## 2022-10-27 RX ORDER — MELOXICAM 15 MG/1
TABLET ORAL
Status: ON HOLD | COMMUNITY
Start: 2022-10-07 | End: 2022-11-03 | Stop reason: HOSPADM

## 2022-10-27 RX ORDER — LIDOCAINE HYDROCHLORIDE 10 MG/ML
0.5 INJECTION, SOLUTION EPIDURAL; INFILTRATION; INTRACAUDAL; PERINEURAL ONCE
Status: CANCELLED | OUTPATIENT
Start: 2022-10-27 | End: 2022-10-27

## 2022-10-27 RX ORDER — OXYCODONE AND ACETAMINOPHEN 10; 325 MG/1; MG/1
TABLET ORAL
COMMUNITY
Start: 2022-09-19

## 2022-10-27 RX ORDER — FAMOTIDINE 20 MG/1
20 TABLET, FILM COATED ORAL
Status: CANCELLED | OUTPATIENT
Start: 2022-10-27 | End: 2022-10-27

## 2022-10-27 RX ORDER — LISDEXAMFETAMINE DIMESYLATE 40 MG/1
CAPSULE ORAL
COMMUNITY
Start: 2022-10-18

## 2022-10-27 RX ORDER — METFORMIN HYDROCHLORIDE 500 MG/1
500 TABLET ORAL NIGHTLY
COMMUNITY
Start: 2022-08-15

## 2022-10-27 RX ORDER — NALOXONE HYDROCHLORIDE 4 MG/.1ML
SPRAY NASAL
COMMUNITY
Start: 2022-09-19

## 2022-10-27 NOTE — DISCHARGE INSTRUCTIONS
Information to Prepare you for your Surgery    PRE-ADMIT TESTING -  [phone]    [address] [facility]          Your surgery has been scheduled at [facility]. We are pleased to have the opportunity to serve you. For Further Information please call [phone].    On the day of surgery please report to the Information Desk on the 1st floor.    CONTACT YOUR PHYSICIAN'S OFFICE THE DAY PRIOR TO YOUR SURGERY TO OBTAIN YOUR ARRIVAL TIME.     The evening before surgery do not eat anything after 9 p.m. ( this includes hard candy, chewing gum and mints).  You may only have GATORADE, POWERADE AND WATER  from 9 p.m. until you leave your home.   DO NOT DRINK ANY LIQUIDS ON THE WAY TO THE HOSPITAL.      Why does your anesthesiologist allow you to drink Gatorade/Powerade before surgery?  Gatorade/Powerade helps to increase your comfort before surgery and to decrease your nausea after surgery. The carbohydrates in Gatorade/Powerade help reduce your body's stress response to surgery.  If you are a diabetic-drink only water prior to surgery.      Current Visitor policy(12/27/2021) - Patients may have 2 visitors pre and post procedure. Only 2 visitors will be allowed in the Surgical building with the patient.     SPECIAL MEDICATION INSTRUCTIONS: TAKE medications checked off by the Anesthesiologist on your Medication List.    Angiogram Patients: Take medications as instructed by your physician, including aspirin.     Surgery Patients:    If you take ASPIRIN - Your PHYSICIAN/SURGEON will need to inform you IF/OR when you need to stop taking aspirin prior to your surgery.     The week prior to surgery do not ot take any medications containing IBUPROFEN or NSAIDS ( Advil, Motrin, Goodys, BC, Aleve, Naproxen etc) If you are not sure if you should take a medicine please call your surgeon's office.  Ok to take Tylenol    Do Not Wear any make-up (especially eye make-up) to  surgery. Please remove any false eyelashes or eyelash extensions. If you arrive the day of surgery with makeup/eyelashes on you will be required to remove prior to surgery. (There is a risk of corneal abrasions if eye makeup/eyelash extensions are not removed)      Leave all valuables at home.   Do Not wear any jewelry or watches, including any metal in body piercings. Jewelry must be removed prior to coming to the hospital.  There is a possibility that rings that are unable to be removed may be cut off if they are on the surgical extremity.    Please remove all hair extensions, wigs, clips and any other metal accessories/ ornaments from your hair.  These items may pose a flammable/fire risk in Surgery and must be removed.    Do not shave your surgical area at least 5 days prior to your surgery. The surgical prep will be performed at the hospital according to Infection Control regulations.    Contact Lens must be removed before surgery. Either do not wear the contact lens or bring a case and solution for storage.  Please bring a container for eyeglasses or dentures as required.  Bring any paperwork your physician has provided, such as consent forms,  history and physicals, doctor's orders, etc.   Bring comfortable clothes that are loose fitting to wear upon discharge. Take into consideration the type of surgery being performed.  Maintain your diet as advised per your physician the day prior to surgery.      Adequate rest the night before surgery is advised.   Park in the Parking lot behind the hospital or in the [facility] across the street from the parking lot. Parking is complimentary.  If you will be discharged the same day as your procedure, please arrange for a responsible adult to drive you home or to accompany you if traveling by taxi.   YOU WILL NOT BE PERMITTED TO DRIVE OR TO LEAVE THE HOSPITAL ALONE AFTER SURGERY.   If you are being discharged the same day, it is strongly recommended that you  arrange for someone to remain with you for the first 24 hrs following your surgery.    The Surgeon will speak to your family/visitor after your surgery regarding the outcome of your surgery and post op care.  The Surgeon may speak to you after your surgery, but there is a possibility you may not remember the details.  Please check with your family members regarding the conversation with the Surgeon.    We strongly recommend whoever is bringing you home be present for discharge instructions.  This will ensure a thorough understanding for your post op home care.    ALL CHILDREN MUST ALWAYS BE ACCOMPANIED BY AN ADULT.    Visitors-Refer to current Visitor policy handouts.    Thank you for your cooperation.  The Staff of [facility].            Bathing Instructions with Hibiclens    Shower the evening before and morning of your procedure with Chlorhexidine (Hibiclens)  do not use Chlorhexidine on your face or genitals. Do not get in your eyes.  Wash your face with water and your regular face wash/soap  Use your regular shampoo  Apply Chlorhexidine (Hibiclens) directly on your skin or on a wet washcloth and wash gently. When showering: Move away from the shower stream when applying Chlorhexidine (Hibiclens) to avoid rinsing off too soon.  Rinse thoroughly with warm water  Do not dilute Chlorhexidine (Hibiclens)   Dry off as usual, do not use any deodorant, powder, body lotions, perfume, after shave or cologne.

## 2022-10-27 NOTE — ANESTHESIA PREPROCEDURE EVALUATION
10/27/2022  Ernesto Casper is a 47 y.o., male.      Pre-op Assessment    I have reviewed the Patient Summary Reports.     I have reviewed the Nursing Notes.    I have reviewed the Medications.     Review of Systems  Anesthesia Hx:  Denies Family Hx of Anesthesia complications.  Personal Hx of Anesthesia complications (hypotension)   Social:  Non-Smoker    Hematology/Oncology:  Hematology Normal   Oncology Normal     EENT/Dental:EENT/Dental Normal   Cardiovascular:   Exercise tolerance: good Denies Hypertension.  168/92 in clinic, denies HTN   Pulmonary:  Pulmonary Normal    Renal/:  Renal/ Normal     Hepatic/GI:   GERD (diet rx)    Musculoskeletal:   Arthritis (berna shoulders)     Neurological:   Chronic Pain Syndrome (daily norco, neurontin)   Endocrine:  Endocrine Normal    Dermatological:  Skin Normal    Psych:   anxiety          Physical Exam  General: Cooperative, Alert and Oriented    Airway:  Mallampati: III   Mouth Opening: Normal  TM Distance: Normal  Neck ROM: Normal ROM  Neck: Girth Increased    Dental:  Intact        Anesthesia Plan  Type of Anesthesia, risks & benefits discussed:    Anesthesia Type: Gen ETT  Intra-op Monitoring Plan: Standard ASA Monitors, Art Line and Central Line  Post Op Pain Control Plan: multimodal analgesia, IV/PO Opioids PRN and peripheral nerve block  Induction:  IV  Airway Plan: Video  Informed Consent: Informed consent signed with the Patient and all parties understand the risks and agree with anesthesia plan.  All questions answered.   ASA Score: 3  Anesthesia Plan Notes: 3/22 R shoulder repair: had block placed, GETA induced, aprox 10 minutes in had severe hypotension poorly responsive to tx, art line placed. Case cx due to unknown etiology of refractory hypotension and planned length of case.    Underwent cardiac w/u (on paper chart), While echo not WNL, no  findings that explain this degree of refractory intraop hypotension. Stress neg for ischemia. Cardiologist cleared patient to return for procedure.     5/22 Pt returned for R shoulder repair. Essentially same result requiring intraop art line, ultimately BP maintenance required epi qqt.     Hx intra op refractory hypotension, unknown etiology. Suspect long standing untreated hypotension +/- contribution from unknown supplement interaction. Plan art line prior to induction, may need CVL for pressor access.       11/3/22  D/w pt. Will plan on block after procedure as it may be contributory to hypotension intraop. Will proceede with preload and A line    Ready For Surgery From Anesthesia Perspective.     .

## 2022-11-03 ENCOUNTER — ANESTHESIA (OUTPATIENT)
Dept: SURGERY | Facility: OTHER | Age: 47
End: 2022-11-03
Payer: COMMERCIAL

## 2022-11-03 ENCOUNTER — HOSPITAL ENCOUNTER (OUTPATIENT)
Facility: OTHER | Age: 47
Discharge: HOME | End: 2022-11-03
Attending: ORTHOPAEDIC SURGERY | Admitting: ORTHOPAEDIC SURGERY
Payer: COMMERCIAL

## 2022-11-03 DIAGNOSIS — M75.121 COMPLETE TEAR OF RIGHT ROTATOR CUFF, UNSPECIFIED WHETHER TRAUMATIC: Primary | ICD-10-CM

## 2022-11-03 DIAGNOSIS — M75.121 COMPLETE ROTATOR CUFF TEAR OR RUPTURE OF RIGHT SHOULDER, NOT SPECIFIED AS TRAUMATIC: ICD-10-CM

## 2022-11-03 PROCEDURE — 71000015 HC POSTOP RECOV 1ST HR: Performed by: ORTHOPAEDIC SURGERY

## 2022-11-03 PROCEDURE — 36000710: Performed by: ORTHOPAEDIC SURGERY

## 2022-11-03 PROCEDURE — 37000008 HC ANESTHESIA 1ST 15 MINUTES: Performed by: ORTHOPAEDIC SURGERY

## 2022-11-03 PROCEDURE — 63600175 PHARM REV CODE 636 W HCPCS: Performed by: NURSE ANESTHETIST, CERTIFIED REGISTERED

## 2022-11-03 PROCEDURE — 36620 INSERTION CATHETER ARTERY: CPT | Performed by: ANESTHESIOLOGY

## 2022-11-03 PROCEDURE — 63600175 PHARM REV CODE 636 W HCPCS: Performed by: ORTHOPAEDIC SURGERY

## 2022-11-03 PROCEDURE — 25000003 PHARM REV CODE 250: Performed by: NURSE ANESTHETIST, CERTIFIED REGISTERED

## 2022-11-03 PROCEDURE — 63600175 PHARM REV CODE 636 W HCPCS: Performed by: ANESTHESIOLOGY

## 2022-11-03 PROCEDURE — 37000009 HC ANESTHESIA EA ADD 15 MINS: Performed by: ORTHOPAEDIC SURGERY

## 2022-11-03 PROCEDURE — 25000003 PHARM REV CODE 250: Performed by: ORTHOPAEDIC SURGERY

## 2022-11-03 PROCEDURE — 25000003 PHARM REV CODE 250: Performed by: ANESTHESIOLOGY

## 2022-11-03 PROCEDURE — 71000033 HC RECOVERY, INTIAL HOUR: Performed by: ORTHOPAEDIC SURGERY

## 2022-11-03 PROCEDURE — 27201423 OPTIME MED/SURG SUP & DEVICES STERILE SUPPLY: Performed by: ORTHOPAEDIC SURGERY

## 2022-11-03 PROCEDURE — C1889 IMPLANT/INSERT DEVICE, NOC: HCPCS | Performed by: ORTHOPAEDIC SURGERY

## 2022-11-03 PROCEDURE — 76942 ECHO GUIDE FOR BIOPSY: CPT | Performed by: ANESTHESIOLOGY

## 2022-11-03 PROCEDURE — 36000711: Performed by: ORTHOPAEDIC SURGERY

## 2022-11-03 PROCEDURE — 71000016 HC POSTOP RECOV ADDL HR: Performed by: ORTHOPAEDIC SURGERY

## 2022-11-03 PROCEDURE — C1713 ANCHOR/SCREW BN/BN,TIS/BN: HCPCS | Performed by: ORTHOPAEDIC SURGERY

## 2022-11-03 PROCEDURE — 71000039 HC RECOVERY, EACH ADD'L HOUR: Performed by: ORTHOPAEDIC SURGERY

## 2022-11-03 DEVICE — 4.75MM BC KNOTLESS SWIVELOCK
Type: IMPLANTABLE DEVICE | Site: SHOULDER | Status: FUNCTIONAL
Brand: ARTHREX®

## 2022-11-03 DEVICE — SELF-PUNCHING TRIPLE LOADED FIBERTAK
Type: IMPLANTABLE DEVICE | Site: SHOULDER | Status: FUNCTIONAL
Brand: ARTHREX®

## 2022-11-03 RX ORDER — DIPHENHYDRAMINE HYDROCHLORIDE 50 MG/ML
INJECTION INTRAMUSCULAR; INTRAVENOUS
Status: DISCONTINUED | OUTPATIENT
Start: 2022-11-03 | End: 2022-11-03

## 2022-11-03 RX ORDER — OXYCODONE AND ACETAMINOPHEN 10; 325 MG/1; MG/1
1 TABLET ORAL EVERY 4 HOURS PRN
Qty: 40 TABLET | Refills: 0 | Status: SHIPPED | OUTPATIENT
Start: 2022-11-03

## 2022-11-03 RX ORDER — FAMOTIDINE 20 MG/1
20 TABLET, FILM COATED ORAL
Status: COMPLETED | OUTPATIENT
Start: 2022-11-03 | End: 2022-11-03

## 2022-11-03 RX ORDER — EPHEDRINE SULFATE 50 MG/ML
INJECTION, SOLUTION INTRAVENOUS
Status: DISCONTINUED | OUTPATIENT
Start: 2022-11-03 | End: 2022-11-03

## 2022-11-03 RX ORDER — LIDOCAINE HYDROCHLORIDE 10 MG/ML
0.5 INJECTION, SOLUTION EPIDURAL; INFILTRATION; INTRACAUDAL; PERINEURAL ONCE
Status: DISCONTINUED | OUTPATIENT
Start: 2022-11-03 | End: 2022-11-03 | Stop reason: HOSPADM

## 2022-11-03 RX ORDER — MINOCYCLINE HYDROCHLORIDE 100 MG/1
100 CAPSULE ORAL EVERY 12 HOURS
Qty: 28 CAPSULE | Refills: 1 | Status: SHIPPED | OUTPATIENT
Start: 2022-11-03

## 2022-11-03 RX ORDER — SODIUM CHLORIDE, SODIUM LACTATE, POTASSIUM CHLORIDE, CALCIUM CHLORIDE 600; 310; 30; 20 MG/100ML; MG/100ML; MG/100ML; MG/100ML
INJECTION, SOLUTION INTRAVENOUS CONTINUOUS
Status: DISCONTINUED | OUTPATIENT
Start: 2022-11-03 | End: 2022-11-03 | Stop reason: HOSPADM

## 2022-11-03 RX ORDER — LIDOCAINE HCL/PF 100 MG/5ML
SYRINGE (ML) INTRAVENOUS
Status: DISCONTINUED | OUTPATIENT
Start: 2022-11-03 | End: 2022-11-03

## 2022-11-03 RX ORDER — HYDROMORPHONE HYDROCHLORIDE 2 MG/ML
0.4 INJECTION, SOLUTION INTRAMUSCULAR; INTRAVENOUS; SUBCUTANEOUS EVERY 5 MIN PRN
Status: DISCONTINUED | OUTPATIENT
Start: 2022-11-03 | End: 2022-11-03 | Stop reason: HOSPADM

## 2022-11-03 RX ORDER — SODIUM CHLORIDE 0.9 % (FLUSH) 0.9 %
3 SYRINGE (ML) INJECTION
Status: DISCONTINUED | OUTPATIENT
Start: 2022-11-03 | End: 2022-11-03 | Stop reason: HOSPADM

## 2022-11-03 RX ORDER — FENTANYL CITRATE 50 UG/ML
INJECTION, SOLUTION INTRAMUSCULAR; INTRAVENOUS
Status: DISCONTINUED | OUTPATIENT
Start: 2022-11-03 | End: 2022-11-03

## 2022-11-03 RX ORDER — ONDANSETRON 4 MG/1
8 TABLET, ORALLY DISINTEGRATING ORAL EVERY 8 HOURS PRN
Qty: 10 TABLET | Refills: 1 | Status: SHIPPED | OUTPATIENT
Start: 2022-11-03

## 2022-11-03 RX ORDER — ONDANSETRON 2 MG/ML
INJECTION INTRAMUSCULAR; INTRAVENOUS
Status: DISCONTINUED | OUTPATIENT
Start: 2022-11-03 | End: 2022-11-03

## 2022-11-03 RX ORDER — EPINEPHRINE 1 MG/ML
INJECTION, SOLUTION, CONCENTRATE INTRAVENOUS
Status: DISCONTINUED | OUTPATIENT
Start: 2022-11-03 | End: 2022-11-03 | Stop reason: HOSPADM

## 2022-11-03 RX ORDER — ROCURONIUM BROMIDE 10 MG/ML
INJECTION, SOLUTION INTRAVENOUS
Status: DISCONTINUED | OUTPATIENT
Start: 2022-11-03 | End: 2022-11-03

## 2022-11-03 RX ORDER — CEFAZOLIN SODIUM 1 G/3ML
2 INJECTION, POWDER, FOR SOLUTION INTRAMUSCULAR; INTRAVENOUS
Status: COMPLETED | OUTPATIENT
Start: 2022-11-03 | End: 2022-11-03

## 2022-11-03 RX ORDER — TRANEXAMIC ACID 100 MG/ML
1000 INJECTION, SOLUTION INTRAVENOUS
Status: COMPLETED | OUTPATIENT
Start: 2022-11-03 | End: 2022-11-03

## 2022-11-03 RX ORDER — ROPIVACAINE HYDROCHLORIDE 5 MG/ML
INJECTION, SOLUTION EPIDURAL; INFILTRATION; PERINEURAL
Status: DISCONTINUED | OUTPATIENT
Start: 2022-11-03 | End: 2022-11-03

## 2022-11-03 RX ORDER — ACETAMINOPHEN 500 MG
1000 TABLET ORAL
Status: DISCONTINUED | OUTPATIENT
Start: 2022-11-03 | End: 2022-11-03 | Stop reason: HOSPADM

## 2022-11-03 RX ORDER — DIPHENHYDRAMINE HYDROCHLORIDE 50 MG/ML
25 INJECTION INTRAMUSCULAR; INTRAVENOUS EVERY 6 HOURS PRN
Status: DISCONTINUED | OUTPATIENT
Start: 2022-11-03 | End: 2022-11-03 | Stop reason: HOSPADM

## 2022-11-03 RX ORDER — OXYCODONE HYDROCHLORIDE 5 MG/1
5 TABLET ORAL
Status: DISCONTINUED | OUTPATIENT
Start: 2022-11-03 | End: 2022-11-03 | Stop reason: HOSPADM

## 2022-11-03 RX ORDER — PROCHLORPERAZINE EDISYLATE 5 MG/ML
5 INJECTION INTRAMUSCULAR; INTRAVENOUS EVERY 30 MIN PRN
Status: DISCONTINUED | OUTPATIENT
Start: 2022-11-03 | End: 2022-11-03 | Stop reason: HOSPADM

## 2022-11-03 RX ORDER — PROPOFOL 10 MG/ML
VIAL (ML) INTRAVENOUS
Status: DISCONTINUED | OUTPATIENT
Start: 2022-11-03 | End: 2022-11-03

## 2022-11-03 RX ORDER — MEPERIDINE HYDROCHLORIDE 25 MG/ML
12.5 INJECTION INTRAMUSCULAR; INTRAVENOUS; SUBCUTANEOUS ONCE AS NEEDED
Status: DISCONTINUED | OUTPATIENT
Start: 2022-11-03 | End: 2022-11-03 | Stop reason: HOSPADM

## 2022-11-03 RX ADMIN — OXYCODONE 5 MG: 5 TABLET ORAL at 01:11

## 2022-11-03 RX ADMIN — FENTANYL CITRATE 50 MCG: 50 INJECTION, SOLUTION INTRAMUSCULAR; INTRAVENOUS at 10:11

## 2022-11-03 RX ADMIN — EPHEDRINE SULFATE 15 MG: 50 INJECTION INTRAVENOUS at 09:11

## 2022-11-03 RX ADMIN — SODIUM CHLORIDE, SODIUM LACTATE, POTASSIUM CHLORIDE, AND CALCIUM CHLORIDE: .6; .31; .03; .02 INJECTION, SOLUTION INTRAVENOUS at 07:11

## 2022-11-03 RX ADMIN — FENTANYL CITRATE 25 MCG: 50 INJECTION, SOLUTION INTRAMUSCULAR; INTRAVENOUS at 09:11

## 2022-11-03 RX ADMIN — CEFAZOLIN 2 G: 330 INJECTION, POWDER, FOR SOLUTION INTRAMUSCULAR; INTRAVENOUS at 09:11

## 2022-11-03 RX ADMIN — LIDOCAINE HYDROCHLORIDE 100 MG: 20 INJECTION, SOLUTION INTRAVENOUS at 08:11

## 2022-11-03 RX ADMIN — SODIUM CHLORIDE, SODIUM LACTATE, POTASSIUM CHLORIDE, AND CALCIUM CHLORIDE: .6; .31; .03; .02 INJECTION, SOLUTION INTRAVENOUS at 08:11

## 2022-11-03 RX ADMIN — HYDROMORPHONE HYDROCHLORIDE 0.4 MG: 2 INJECTION INTRAMUSCULAR; INTRAVENOUS; SUBCUTANEOUS at 11:11

## 2022-11-03 RX ADMIN — HYDROCORTISONE SODIUM SUCCINATE 100 MG: 100 INJECTION, POWDER, FOR SOLUTION INTRAMUSCULAR; INTRAVENOUS at 09:11

## 2022-11-03 RX ADMIN — ROCURONIUM BROMIDE 50 MG: 10 SOLUTION INTRAVENOUS at 08:11

## 2022-11-03 RX ADMIN — FENTANYL CITRATE 50 MCG: 50 INJECTION, SOLUTION INTRAMUSCULAR; INTRAVENOUS at 09:11

## 2022-11-03 RX ADMIN — DIPHENHYDRAMINE HYDROCHLORIDE 12.5 MG: 50 INJECTION, SOLUTION INTRAMUSCULAR; INTRAVENOUS at 09:11

## 2022-11-03 RX ADMIN — TRANEXAMIC ACID 1000 MG: 100 INJECTION, SOLUTION INTRAVENOUS at 09:11

## 2022-11-03 RX ADMIN — PROPOFOL 125 MCG/KG/MIN: 10 INJECTION, EMULSION INTRAVENOUS at 09:11

## 2022-11-03 RX ADMIN — EPHEDRINE SULFATE 10 MG: 50 INJECTION INTRAVENOUS at 09:11

## 2022-11-03 RX ADMIN — ROPIVACAINE HYDROCHLORIDE 30 ML: 5 INJECTION, SOLUTION EPIDURAL; INFILTRATION; PERINEURAL at 12:11

## 2022-11-03 RX ADMIN — ONDANSETRON HYDROCHLORIDE 4 MG: 2 INJECTION INTRAMUSCULAR; INTRAVENOUS at 09:11

## 2022-11-03 RX ADMIN — ROCURONIUM BROMIDE 20 MG: 10 SOLUTION INTRAVENOUS at 10:11

## 2022-11-03 RX ADMIN — SUGAMMADEX 200 MG: 100 INJECTION, SOLUTION INTRAVENOUS at 11:11

## 2022-11-03 RX ADMIN — FENTANYL CITRATE 100 MCG: 50 INJECTION, SOLUTION INTRAMUSCULAR; INTRAVENOUS at 08:11

## 2022-11-03 RX ADMIN — PROPOFOL 200 MG: 10 INJECTION, EMULSION INTRAVENOUS at 08:11

## 2022-11-03 RX ADMIN — PROPOFOL 50 MG: 10 INJECTION, EMULSION INTRAVENOUS at 10:11

## 2022-11-03 RX ADMIN — FAMOTIDINE 20 MG: 20 TABLET ORAL at 07:11

## 2022-11-03 NOTE — DISCHARGE INSTRUCTIONS
Shoulder Arthroscopy Post-Op Instructions                                       Dr. Donnell Shaffer    1. Sling/Brace- You should wear the brace until the first post-op visit. You can take the sling off to shower but keep your arm at your side.  Do not lift your arm away from your body unless told otherwise.  I will give you/your family specific instructions about the length of brace wear.    2. Bandage- If you have physical therapy set up they will remove the bandage. Otherwise you can remove it in 3 days. Keep the incisions clean, dry and covered. Do not get it wet until you see me.     3. Ice- Use the polar care as much as you want. Make sure there are clothes or a towel between the cooling unit and your skin.     4. Exercise- If you have PT set up, they will instruct you on exercises to do. If you do not, it is OK to lean your arm over and make circles with your hand pointing to the floor (called Pendulum exercises). Do not lift or grasp anything away from the body until you see me. It is OK to take your arm out of the sling and extend your elbow as long as your elbow is at your side.     5. Medications- You will be given pain and nausea prescriptions to go home. Take the medications as written.  Call the office if you are running low with at least 2-3 days notice to give us time to refill it.  We also recommend taking a baby aspirin for 2 weeks after surgery to decrease the (very,very low) risk of blood clot formation.    6. Bathing- Do not get your shoulder wet until you see me in clinic.    7. Appointment- You should already have your post-op appointment scheduled. If you do not or you can't remember, call the office for more information.

## 2022-11-03 NOTE — OP NOTE
[facility] ([facility]  Surgery Department  Operative Note    SUMMARY     Date of Procedure: 11/3/2022     Procedure:   Left shoulder arthroscopic massive rotator cuff repair  Left shoulder arthroscopic subacromial decompression (acromioplasty, CA ligament resection, bursectomy, lateral acromial bevelling)  Left shoulder arthroscopic biceps tenotomy  Left shoulder Lior In-Space Balloon arthroplasty    Surgeon(s) and Role:     * Donnell Shaffer MD - Primary    Assistant: Duncan ROACH    Pre-Operative Diagnosis:   Left shoulder massive rotator cuff tear  Left right shoulder long head biceps rupture  Left shoulder impingement syndrome    Post-Operative Diagnosis:   Same    Anesthesia: General + regional    Technical Procedures Used: Mr. Casper was brought to the operating room 11/03/2022 for planned left shoulder arthroscopy.  He was given 2 g of Ancef and 1 g of trans anemic acid preoperatively.  He was brought to the operating room and placed in the supine position.  General endotracheal anesthesia was administered.  Examination under anesthesia revealed full passive motion and no pathologic laxity.  He was then placed in the lateral decubitus position with all bony prominences padded appropriately with an axillary roll.  A shoulder suspension device was used.  The left shoulder was then prepped and draped in the usual sterile fashion and a time-out was performed.  The bony landmarks were marked with surgical marking pen and 50 cc of normal saline were injected into the glenohumeral joint aid capsular distention.  Standard posterior portal was then established.  Diagnostic arthroscopy then proceeded.      A massive retracted rotator cuff tear was immediately identified.  The anterior cable was intact but the supraspinatus had retracted to the level of the glenoid in a crescent type fashion which involved the entire anterior half of the infraspinatus before reconstituting posteriorly.  There was a  prior long head biceps rupture with an approximate 3 cm stump remaining within the joint.  There was a lot of soft tissue debris consistent with labral tissue was seen within the joint.  The subscapularis was unremarkable.  There was mild grade 1 chondromalacia diffusely involving the humeral head and glenoid but no focal or full-thickness defects.  An anterior portal was established through the rotator interval.  The shaver was used to remove any debris in trim back some of the degenerative labral fraying anteriorly and posteriorly.  A meniscal biter was used to remove the proximal portion of the remaining long head biceps that had previously ruptured to the level of the superior labrum.  A lateral portal was established with spinal needle localization.  A very thorough bursectomy was performed as well as extensive releases involving the supraspinatus and infraspinatus to gain mobility.  After extensive releases I was able to reduce the supraspinatus infraspinatus back to the articular margin with mild tension.  I decided to medialized the footprint approximately 5 mm of the shaver was used to lightly decorticate proximally 5 mm of articular cartilage to move the insertion site medially.  After a 2 traction sutures were placed and I continued my releases until I felt like I had mobilize the tendon as much as possible.  Through an accessory lateral portal 2, 2.6 mm triple loaded FiberTak anchors were placed at the central insertion site of the supraspinatus and infraspinatus.  All 12 sutures were passed in a horizontal mattress fashion.  The traction suture was also used a rip stop type fashion.  Microfracture awls were then used to create several channels for biologic elements at the repair site.  Sliding knots were then tied starting posteriorly and working anteriorly.  No dog ears were noted and a nice relatively tension minimal repair was noted.  The shaver was then used to remove any soft tissue or bony debris.   A lateral row/trans osseous equivalent construct was used with 6 sutures in each of the anterior lateral and posterior lateral 4.75 mm SwiveLock anchors.  The tissue lay down nicely and upon testing it moved in continuity.      I then performed a light subacromial decompression.  The CA ligament was preserved and recessed off the anterior/lateral acromion and an acromioplasty was performed to a type 1 morphology.  Shaver was then used to remove any soft tissue or bony debris.      Next I used the size large [employer] in space balloon, filled with the appropriate amount of saline.  I confirmed it was all within the joint and the shoulder was taken out of suspension and ranged in the balloon was in good position and tired early within the subacromial space.  After this was done the portals were then closed with 3-0 Prolene suture followed by soft dressing followed by a polar Care unit followed by an abduction brace.  The patient was then extubated in the operating room and taken to the PACU without complication.    Description of the Findings of the Procedure:  See dictation    Significant Surgical Tasks Conducted by the Assistant(s), if Applicable:  Positioning, prepping, draping, camera, instrumentation, closure, bandage, brace    Complications: No    Estimated Blood Loss (EBL): 5cc           Implants:   Implant Name Type Inv. Item Serial No.  Lot No. LRB No. Used Action   SELF-PUNCHING 2.6 FIBERTAK SUTURE ANCHOR, TRIPLE LOADED    [employer] [id number] Left 2 Implanted   ANCHOR SWIVELOCK KNTLS BLUE #2 - [id number]  ANCHOR SWIVELOCK KNTLS BLUE #2  [employer] [id number] Left 2 Implanted   [employer]     [id number] Left 1 Implanted       Specimens:   Specimen (24h ago, onward)      None                    Condition: Good    Disposition: PACU - hemodynamically stable.    Attestation: I was present and scrubbed for the entire procedure.    Discharge Note    SUMMARY     Admit Date: 11/3/2022    Discharge  Date and Time:  11/03/2022 11:15 AM    Hospital Course (synopsis of major diagnoses, care, treatment, and services provided during the course of the hospital stay): outpt     Final Diagnosis: Post-Op Diagnosis Codes:     * Complete tear of right rotator cuff, unspecified whether traumatic [M75.121]    Disposition: Home or Self Care    Follow Up/Patient Instructions:     Medications:  Reconciled Home Medications:      Medication List        START taking these medications      minocycline 100 MG capsule  Commonly known as: MINOCIN,DYNACIN  Take 1 capsule (100 mg total) by mouth every 12 (twelve) hours.     ondansetron 4 MG Tbdl  Commonly known as: ZOFRAN-ODT  Take 2 tablets (8 mg total) by mouth every 8 (eight) hours as needed (nausea).            CHANGE how you take these medications      * oxyCODONE-acetaminophen  mg per tablet  Commonly known as: PERCOCET  TAKE 1 TABLET ORAL THREE TIMES A DAY AS NEEDED FOR PAIN  What changed: Another medication with the same name was added. Make sure you understand how and when to take each.     * oxyCODONE-acetaminophen  mg per tablet  Commonly known as: PERCOCET  Take 1 tablet by mouth every 4 (four) hours as needed for Pain.  What changed: You were already taking a medication with the same name, and this prescription was added. Make sure you understand how and when to take each.           * This list has 2 medication(s) that are the same as other medications prescribed for you. Read the directions carefully, and ask your doctor or other care provider to review them with you.                CONTINUE taking these medications      5-HTP ORAL  Take by mouth. 1 capsule By mouth every day     ALPRAZolam 0.5 MG tablet  Commonly known as: XANAX  Take 0.5 mg by mouth 2 (two) times daily as needed.     b complex vitamins tablet  Take by mouth. 1 Tablet Oral Every day     busPIRone 7.5 MG tablet  Commonly known as: BUSPAR  Take 7.5 mg by mouth 3 (three) times daily.      fluticasone propionate 50 mcg/actuation nasal spray  Commonly known as: FLONASE  INSTILL 2 SPRAYS DAILY INTO EACH NOSTRIL     gabapentin 300 MG capsule  Commonly known as: NEURONTIN  Take 300 mg by mouth 3 (three) times daily.     metFORMIN 500 MG tablet  Commonly known as: GLUCOPHAGE  Take 500 mg by mouth every evening.     naloxone 4 mg/actuation Spry  Commonly known as: NARCAN  AS DIRECTED     phenylephrine HCL 0.5% 0.5 % nasal spray  Commonly known as: SANDRA-SYNEPHRINE  1 drop by Nasal route every 4 (four) hours as needed for Congestion.     sildenafiL 25 MG tablet  Commonly known as: VIAGRA  Take 1-4 tablets ( mg total) by mouth as needed for Erectile Dysfunction.     tiZANidine 4 MG tablet  Commonly known as: ZANAFLEX  Take 1 tablet by mouth 2 (two) times daily as needed.     VYVANSE 40 MG Cap  Generic drug: lisdexamfetamine  TAKE 1 CAPSULE BY MOUTH EVERY AFTERNOON     zolpidem 10 mg Tab  Commonly known as: AMBIEN  Take 10 mg by mouth nightly as needed.            STOP taking these medications      meloxicam 15 MG tablet  Commonly known as: MOBIC            Discharge Procedure Orders   Diet general     Call MD for:  temperature >100.4     Call MD for:  persistent nausea and vomiting     Call MD for:  severe uncontrolled pain     Call MD for:  difficulty breathing, headache or visual disturbances     Call MD for:  redness, tenderness, or signs of infection (pain, swelling, redness, odor or green/yellow discharge around incision site)     Call MD for:  hives     Call MD for:  persistent dizziness or light-headedness     Call MD for:  extreme fatigue     Ice to affected area     Keep surgical extremity elevated     Lifting restrictions     Remove dressing in 48 hours   Order Comments: Then change daily.  Keep clean, dry and covered

## 2022-11-03 NOTE — OR NURSING
Right radial arterial line discontinued at 1210. Manual pressure held for 15 min. Hemostasis achieved. Light pressure dressing applied. Will continue to monitor and assess.

## 2022-11-03 NOTE — ANESTHESIA PROCEDURE NOTES
Intubation    Date/Time: 11/3/2022 8:56 AM  Performed by: Nadiya Arredondo CRNA  Authorized by: Junior Madrigal MD     Intubation:     Induction:  Intravenous    Intubated:  Postinduction    Mask Ventilation:  Easy mask    Attempts:  1    Attempted By:  CRNA    Method of Intubation:  Video laryngoscopy    Blade:  Geronimo 4    Laryngeal View Grade: Grade I - full view of cords      Difficult Airway Encountered?: Yes      Complications:  None    Airway Device:  Oral endotracheal tube    Airway Device Size:  8.0    Style/Cuff Inflation:  Cuffed (inflated to minimal occlusive pressure)    Tube secured:  22.5    Secured at:  The lips    Placement Verified By:  Capnometry    Complicating Factors:  None    Findings Post-Intubation:  Atraumatic/condition of teeth unchanged and BS equal bilateral

## 2022-11-03 NOTE — TRANSFER OF CARE
Anesthesia Transfer of Care Note    Patient: Ernesto Casper    Procedure(s) Performed: Procedure(s) (LRB):  ARTHROSCOPY, SHOULDER (Left)  REPAIR, ROTATOR CUFF (Left)  ARTHROSCOPY, SHOULDER, WITH SUBACROMINAL DECOMPRESSION AND BALLOON (Left)  EXTENSIVE DEBRIDEMENT, SHOULDER, ARTHROSCOPIC (Left)    Patient location: PACU    Anesthesia Type: general    Transport from OR: Transported from OR on 6-10 L/min O2 by face mask with adequate spontaneous ventilation    Post pain: adequate analgesia    Post assessment: no apparent anesthetic complications    Post vital signs: stable    Level of consciousness: awake    Nausea/Vomiting: no nausea/vomiting    Complications: none    Transfer of care protocol was followed      Last vitals:   Visit Vitals  BP (!) 183/83 (BP Location: Right leg, Patient Position: Lying)   Pulse 88   Temp 36.4 °C (97.6 °F) (Axillary)   Resp 20   Wt 95.3 kg (210 lb)   SpO2 97%   BMI 31.93 kg/m²

## 2022-11-03 NOTE — ANESTHESIA PROCEDURE NOTES
Peripheral Block    Patient location during procedure: pre-op   Block not for primary anesthetic.  Reason for block: at surgeon's request and post-op pain management   Post-op Pain Location: left   Start time: 11/3/2022 12:02 PM  Timeout: 11/3/2022 12:00 PM   End time: 11/3/2022 12:07 PM    Staffing  Authorizing Provider: Junior Madrigal MD  Performing Provider: Junior Madrigal MD    Preanesthetic Checklist  Completed: patient identified, IV checked, site marked, risks and benefits discussed, surgical consent, monitors and equipment checked, pre-op evaluation and timeout performed  Peripheral Block  Patient position: sitting  Prep: ChloraPrep  Patient monitoring: heart rate, cardiac monitor, continuous pulse ox, continuous capnometry and frequent blood pressure checks  Block type: interscalene  Laterality: left  Injection technique: single shot  Needle  Needle type: Stimuplex   Needle gauge: 22 G  Needle length: 2 in  Needle localization: anatomical landmarks and ultrasound guidance   -ultrasound image captured on disc.  Assessment  Injection assessment: negative aspiration, negative parasthesia and local visualized surrounding nerve  Paresthesia pain: none  Heart rate change: no  Slow fractionated injection: yes  Pain Tolerance: comfortable throughout block  Medications:    Medications: ropivacaine (NAROPIN) injection 0.5% - Perineural   30 mL - 11/3/2022 12:05:00 PM    Additional Notes  VSS.  [facility] RN monitoring vitals throughout procedure.  Patient tolerated procedure well.

## 2022-11-03 NOTE — ANESTHESIA POSTPROCEDURE EVALUATION
Anesthesia Post Evaluation    Patient: Ernesto Casper    Procedure(s) Performed: Procedure(s) (LRB):  ARTHROSCOPY, SHOULDER (Left)  REPAIR, ROTATOR CUFF (Left)  ARTHROSCOPY, SHOULDER, WITH SUBACROMINAL DECOMPRESSION AND BALLOON (Left)  EXTENSIVE DEBRIDEMENT, SHOULDER, ARTHROSCOPIC (Left)    Final Anesthesia Type: general      Patient location during evaluation: PACU  Patient participation: Yes- Able to Participate  Level of consciousness: awake and alert  Post-procedure vital signs: reviewed and stable  Pain management: adequate  Airway patency: patent    PONV status at discharge: No PONV  Anesthetic complications: no      Cardiovascular status: blood pressure returned to baseline and stable  Respiratory status: room air  Hydration status: euvolemic  Follow-up not needed.          Vitals Value Taken Time   /80 11/03/22 1300   Temp 36.6 °C (97.8 °F) 11/03/22 1300   Pulse 96 11/03/22 1300   Resp 18 11/03/22 1300   SpO2 97 % 11/03/22 1300         Event Time   Out of Recovery 12:51:38         Pain/Trevon Score: Pain Rating Prior to Med Admin: 9 (11/3/2022 11:50 AM)  Pain Rating Post Med Admin: 4 (11/3/2022 12:45 PM)  Trevon Score: 10 (11/3/2022  1:00 PM)

## 2022-11-03 NOTE — ANESTHESIA PROCEDURE NOTES
Arterial    Diagnosis: intraop hypotension history    Patient location during procedure: holding area  Procedure start time: 11/3/2022 7:45 AM  Procedure end time: 11/3/2022 7:50 AM    Staffing  Authorizing Provider: Junior Madrigal MD  Performing Provider: Junior Madrigal MD    Anesthesiologist was present at the time of the procedure.    Preanesthetic Checklist  Completed: patient identified, IV checked, site marked, risks and benefits discussed, surgical consent, monitors and equipment checked, pre-op evaluation, timeout performed and anesthesia consent givenArterial  Skin Prep: chlorhexidine gluconate  Orientation: left    Catheter Size: 20 G  Catheter placement by Ultrasound guidance. Heme positive aspiration all ports.   Vessel Caliber: patent  Needle advanced into vessel with real time Ultrasound guidance.Insertion Attempts: 1  Assessment  Dressing: secured with tape and tegaderm  Patient: Tolerated well

## 2022-11-04 VITALS
SYSTOLIC BLOOD PRESSURE: 150 MMHG | DIASTOLIC BLOOD PRESSURE: 100 MMHG | BODY MASS INDEX: 31.93 KG/M2 | WEIGHT: 210 LBS | OXYGEN SATURATION: 99 % | TEMPERATURE: 98 F | HEART RATE: 99 BPM | RESPIRATION RATE: 18 BRPM

## 2023-03-16 ENCOUNTER — OFFICE VISIT (OUTPATIENT)
Dept: OTOLARYNGOLOGY | Facility: CLINIC | Age: 48
End: 2023-03-16
Payer: COMMERCIAL

## 2023-03-16 VITALS — WEIGHT: 219.13 LBS | BODY MASS INDEX: 33.21 KG/M2 | RESPIRATION RATE: 16 BRPM | HEIGHT: 68 IN

## 2023-03-16 DIAGNOSIS — H65.02 NON-RECURRENT ACUTE SEROUS OTITIS MEDIA OF LEFT EAR: ICD-10-CM

## 2023-03-16 DIAGNOSIS — H69.93 DYSFUNCTION OF BOTH EUSTACHIAN TUBES: ICD-10-CM

## 2023-03-16 DIAGNOSIS — J06.9 VIRAL URI: ICD-10-CM

## 2023-03-16 DIAGNOSIS — H66.001 NON-RECURRENT ACUTE SUPPURATIVE OTITIS MEDIA OF RIGHT EAR WITHOUT SPONTANEOUS RUPTURE OF TYMPANIC MEMBRANE: Primary | ICD-10-CM

## 2023-03-16 PROCEDURE — 1160F PR REVIEW ALL MEDS BY PRESCRIBER/CLIN PHARMACIST DOCUMENTED: ICD-10-PCS | Mod: CPTII,S$GLB,, | Performed by: PHYSICIAN ASSISTANT

## 2023-03-16 PROCEDURE — 1159F MED LIST DOCD IN RCRD: CPT | Mod: CPTII,S$GLB,, | Performed by: PHYSICIAN ASSISTANT

## 2023-03-16 PROCEDURE — 1160F RVW MEDS BY RX/DR IN RCRD: CPT | Mod: CPTII,S$GLB,, | Performed by: PHYSICIAN ASSISTANT

## 2023-03-16 PROCEDURE — 99999 PR PBB SHADOW E&M-EST. PATIENT-LVL V: ICD-10-PCS | Mod: PBBFAC,,, | Performed by: PHYSICIAN ASSISTANT

## 2023-03-16 PROCEDURE — 3008F PR BODY MASS INDEX (BMI) DOCUMENTED: ICD-10-PCS | Mod: CPTII,S$GLB,, | Performed by: PHYSICIAN ASSISTANT

## 2023-03-16 PROCEDURE — 1159F PR MEDICATION LIST DOCUMENTED IN MEDICAL RECORD: ICD-10-PCS | Mod: CPTII,S$GLB,, | Performed by: PHYSICIAN ASSISTANT

## 2023-03-16 PROCEDURE — 3008F BODY MASS INDEX DOCD: CPT | Mod: CPTII,S$GLB,, | Performed by: PHYSICIAN ASSISTANT

## 2023-03-16 PROCEDURE — 99999 PR PBB SHADOW E&M-EST. PATIENT-LVL V: CPT | Mod: PBBFAC,,, | Performed by: PHYSICIAN ASSISTANT

## 2023-03-16 PROCEDURE — 99203 PR OFFICE/OUTPT VISIT, NEW, LEVL III, 30-44 MIN: ICD-10-PCS | Mod: S$GLB,,, | Performed by: PHYSICIAN ASSISTANT

## 2023-03-16 PROCEDURE — 99203 OFFICE O/P NEW LOW 30 MIN: CPT | Mod: S$GLB,,, | Performed by: PHYSICIAN ASSISTANT

## 2023-03-16 RX ORDER — AMOXICILLIN AND CLAVULANATE POTASSIUM 875; 125 MG/1; MG/1
1 TABLET, FILM COATED ORAL EVERY 12 HOURS
Qty: 20 TABLET | Refills: 0 | Status: SHIPPED | OUTPATIENT
Start: 2023-03-16 | End: 2023-03-26

## 2023-03-16 RX ORDER — MELOXICAM 15 MG/1
15 TABLET ORAL DAILY PRN
COMMUNITY
Start: 2022-12-04

## 2023-03-16 RX ORDER — LEVOCETIRIZINE DIHYDROCHLORIDE 5 MG/1
5 TABLET, FILM COATED ORAL NIGHTLY
Qty: 90 TABLET | Refills: 0 | Status: SHIPPED | OUTPATIENT
Start: 2023-03-16 | End: 2023-06-12

## 2023-03-16 NOTE — PATIENT INSTRUCTIONS
Use flonase 1 spray to each nostril twice daily. Take xyzal 5mg in the evening to help clear middle ear effusion. Follow up in 2 weeks to recheck ear. Do warm salt water gargles 3 times a day for the next week.     amoxicillin-clavulanate 875-125mg (AUGMENTIN) 875-125 mg per tablet 20 tablet 0 3/16/2023 3/26/2023 No   Sig - Route: Take 1 tablet by mouth every 12 (twelve) hours. for 10 days - Oral

## 2023-03-16 NOTE — PROGRESS NOTES
[facility]    Subjective:      Patient: Ernesto Casper Patient PCP: Primary Doctor No         :  1975     Sex:  male      MRN:  [id number]          Date of Visit: 2023      Chief Complaint: Ear Fullness/muffled hearing    Patient ID: Ernesto Casper is a 47 y.o. male non-smoker who presents to clinic for evaluation of aural fullness and muffled hearing. Pt began having right aural fullness with muffled hearing Friday March 10, 2023. Pt states that he tried swimmer's ear drops at home, but did not have improvement in ear symptoms. Pt states that he has developed scratchy throat and had episode of coughing up yellow phlegm. Pt has also developed post-nasal drip. Pt states that he has also since developed left aural fullness with left sided muffled hearing. Pt has had associated right high-pitched non-pulsatile tinnitus and states that he can feel fluid move in his ear when changing certain positions. Pt states that he has had some issues with right ear pain. Pt denies fever/chills or ear drainage. Pt states that he had 1 set of bilateral ear tubes as a child.     Past Medical History  He has a past medical history of ADD (attention deficit disorder with hyperactivity), Allergy, Anxiety, GERD (gastroesophageal reflux disease), and Hypogonadism in male.    Family History  His family history includes No Known Problems in his brother, father, mother, and sister.    Past Surgical History:   Procedure Laterality Date    ARTHROSCOPIC DEBRIDEMENT OF SHOULDER Left 11/3/2022    Procedure: EXTENSIVE DEBRIDEMENT, SHOULDER, ARTHROSCOPIC;  Surgeon: Donnell Shaffer MD;  Location: [facility];  Service: Orthopedics;  Laterality: Left;    ARTHROSCOPY OF SHOULDER WITH DECOMPRESSION OF SUBACROMIAL SPACE Right 2022    Procedure: ARTHROSCOPY, SHOULDER, WITH SUBACROMIAL SPACE DECOMPRESSION;  Surgeon: Donnell Shaffer MD;  Location: [facility];  Service: Orthopedics;  Laterality: Right;    ARTHROSCOPY OF SHOULDER WITH  DECOMPRESSION OF SUBACROMIAL SPACE Left 11/3/2022    Procedure: ARTHROSCOPY, SHOULDER, WITH SUBACROMINAL DECOMPRESSION AND BALLOON;  Surgeon: Donnell Shaffer MD;  Location: [facility];  Service: Orthopedics;  Laterality: Left;    BICEPS TENDON REPAIR Right 2014    FIXATION OF TENDON Right 5/5/2022    Procedure: FIXATION, TENDON / BICEPS TENODESIS;  Surgeon: Donnell Shaffer MD;  Location: [facility] OR;  Service: Orthopedics;  Laterality: Right;    herniated disc      ROTATOR CUFF REPAIR      ROTATOR CUFF REPAIR Right 5/5/2022    Procedure: REPAIR, ROTATOR CUFF;  Surgeon: Donnell Shaffer MD;  Location: [facility] OR;  Service: Orthopedics;  Laterality: Right;    ROTATOR CUFF REPAIR Left 11/3/2022    Procedure: REPAIR, ROTATOR CUFF;  Surgeon: Donnell Shaffer MD;  Location: [facility] OR;  Service: Orthopedics;  Laterality: Left;    SHOULDER ARTHROSCOPY Right 5/5/2022    Procedure: ARTHROSCOPY, SHOULDER;  Surgeon: Donnell Shaffer MD;  Location: [facility];  Service: Orthopedics;  Laterality: Right;    SHOULDER ARTHROSCOPY Left 11/3/2022    Procedure: ARTHROSCOPY, SHOULDER;  Surgeon: Donnell Shaffer MD;  Location: [facility];  Service: Orthopedics;  Laterality: Left;     Social History     Tobacco Use    Smoking status: Never     Passive exposure: Current    Smokeless tobacco: Never   Substance and Sexual Activity    Alcohol use: No    Drug use: No    Sexual activity: Not on file     Medications  He has a current medication list which includes the following prescription(s): 5-hydroxytryptophan (5-htp), alprazolam, b complex vitamins, buspirone, fluticasone propionate, gabapentin, metformin, oxycodone-acetaminophen, oxycodone-acetaminophen, phenylephrine hcl 0.5%, sildenafil, tizanidine, vyvanse, zolpidem, amoxicillin-clavulanate 875-125mg, levocetirizine, meloxicam, minocycline, naloxone, and ondansetron, and the following Facility-Administered Medications: lactated ringers and lidocaine (pf) 10 mg/ml (1%).    Review of patient's allergies  indicates:  No Known Allergies  All medications, allergies, and past history have been reviewed.    Objective:      Vitals:  Vitals - 1 value per visit 11/3/2022 3/16/2023 3/16/2023   SYSTOLIC 150 - -   DIASTOLIC 100 - -   Pulse 99 - -   Temp - - -   Resp 18 - 16   SPO2 99 - -   Weight (lb) - - 219.14   Weight (kg) - - 99.4   Height - - 68   BMI (Calculated) - - 33.3   VISIT REPORT - - -   Pain Score  - 4 -   Some encounter information is confidential and restricted. Go to Review Flowsheets activity to see all data.       Body surface area is 2.18 meters squared.    Physical Exam  Constitutional:       General: He is not in acute distress.     Appearance: Normal appearance. He is not ill-appearing.   HENT:      Head: Normocephalic and atraumatic.      Right Ear: Ear canal and external ear normal. A middle ear effusion (purulent) is present. Tympanic membrane is erythematous and bulging. Tympanic membrane is not perforated or retracted.      Left Ear: Ear canal and external ear normal. A middle ear effusion (serous) is present. Tympanic membrane is not perforated, erythematous, retracted or bulging.      Nose: Nose normal.      Mouth/Throat:      Lips: Pink. No lesions.      Mouth: Mucous membranes are moist. No oral lesions.      Tongue: No lesions.      Palate: No lesions.      Pharynx: Oropharynx is clear. Uvula midline. No pharyngeal swelling, oropharyngeal exudate, posterior oropharyngeal erythema or uvula swelling.   Eyes:      General:         Right eye: No discharge.         Left eye: No discharge.      Extraocular Movements: Extraocular movements intact.      Conjunctiva/sclera: Conjunctivae normal.   Pulmonary:      Effort: Pulmonary effort is normal.   Neurological:      General: No focal deficit present.      Mental Status: He is alert and oriented to person, place, and time. Mental status is at baseline.   Psychiatric:         Mood and Affect: Mood normal.         Behavior: Behavior normal.          Thought Content: Thought content normal.         Judgment: Judgment normal.       Labs:  WBC   Date Value Ref Range Status   04/18/2016 7.14 3.90 - 12.70 K/uL Final     Platelets   Date Value Ref Range Status   04/18/2016 193 150 - 350 K/uL Final     Creatinine   Date Value Ref Range Status   01/30/2015 1.2 0.5 - 1.4 mg/dL Final     TSH   Date Value Ref Range Status   01/30/2015 3.089 0.400 - 4.000 uIU/mL Final     Glucose   Date Value Ref Range Status   01/30/2015 95 70 - 110 mg/dL Final     Hemoglobin A1C   Date Value Ref Range Status   01/08/2013 5.2 4.0 - 6.2 % Final     All lab results and imaging results have been reviewed.    Assessment:        ICD-10-CM ICD-9-CM   1. Non-recurrent acute suppurative otitis media of right ear without spontaneous rupture of tympanic membrane  H66.001 382.00   2. Non-recurrent acute serous otitis media of left ear  H65.02 381.01   3. Viral URI  J06.9 465.9   4. Dysfunction of both eustachian tubes  H69.83 381.81            Plan:      Right purulent OM and left serous OM likely secondary to viral URI causing bilateral ETD. Throat examination normal today in office. Start Augmentin 875mg BID x 10 days to treat right purulent OM. Use flonase 1 spray to each nostril twice daily. Take xyzal 5mg in the evening to help clear middle ear effusion. Follow up in 2 weeks to recheck right ear. Do warm salt water gargles 3 times a day for the next week. Pt advised that if right purulent OM clears and he still has serous OM at follow up, then I will have him start nasal valsalva (popping ears) 4-6 times a day. We will hold on nasal valsalva for now due to bulging right TM with purulent OM.

## 2023-06-11 DIAGNOSIS — H65.02 NON-RECURRENT ACUTE SEROUS OTITIS MEDIA OF LEFT EAR: ICD-10-CM

## 2023-06-11 DIAGNOSIS — H66.001 NON-RECURRENT ACUTE SUPPURATIVE OTITIS MEDIA OF RIGHT EAR WITHOUT SPONTANEOUS RUPTURE OF TYMPANIC MEMBRANE: ICD-10-CM

## 2023-06-12 RX ORDER — LEVOCETIRIZINE DIHYDROCHLORIDE 5 MG/1
TABLET, FILM COATED ORAL
Qty: 90 TABLET | Refills: 0 | Status: SHIPPED | OUTPATIENT
Start: 2023-06-12

## (undated) DEVICE — DRESSING XEROFORM FOIL PK 1X8

## (undated) DEVICE — SOL IRR NACL .9% 3000ML

## (undated) DEVICE — SEE L#120831

## (undated) DEVICE — ELECTRODE REM PLYHSV RETURN 9

## (undated) DEVICE — PAD SHOULDER CARE POLAR

## (undated) DEVICE — DRAPE STERI U-SHAPED 47X51IN

## (undated) DEVICE — GLOVE BIOGEL SKINSENSE PI 7.5

## (undated) DEVICE — SUT PDS II 0 CT VIL MONO 36

## (undated) DEVICE — UNDERGLOVES BIOGEL PI SIZE 8

## (undated) DEVICE — CANNULA TWIST IN 7MM X 7CM

## (undated) DEVICE — GAUZE SPONGE 4X4 12PLY

## (undated) DEVICE — ADHESIVE DERMABOND ADVANCED

## (undated) DEVICE — SPONGE COTTON TRAY 4X4IN

## (undated) DEVICE — KIT TRACTION SHLDR ST DISP LF

## (undated) DEVICE — SUT PROLENE 3-0 FS-1 MONO18

## (undated) DEVICE — POSITIONER IV ARMBOARD FOAM

## (undated) DEVICE — Device

## (undated) DEVICE — CANNULA PASSPORT 8 MM X 5CM

## (undated) DEVICE — ALCOHOL ISOPROPYL BLU 70% 16OZ

## (undated) DEVICE — TAPE MEDIPORE 3 X 10YD

## (undated) DEVICE — GLOVE BIOGEL SKINSENSE PI 8.0

## (undated) DEVICE — SUPPORT SLING SHOT II MEDIUM

## (undated) DEVICE — TUBE SET INFLOW/OUTFLOW

## (undated) DEVICE — NDL ARTHSCP MF SCORPION

## (undated) DEVICE — TAPE MEDIPORE 4IN X 2YDS

## (undated) DEVICE — BLADE GATOR 3.5 6 EACH/BOX

## (undated) DEVICE — DRESSING XEROFORM 1X8IN

## (undated) DEVICE — PAD ABD 8X10 STERILE

## (undated) DEVICE — SET EXTENSION 30 IN W/LL ROLLE

## (undated) DEVICE — BLADE SHAVER 4.5 6/BX

## (undated) DEVICE — WAND COBLATION TURBOVAC 90

## (undated) DEVICE — APPLICATOR CHLORAPREP ORN 26ML

## (undated) DEVICE — SUT SUTURETAPE TIGERLINK 1.3MM

## (undated) DEVICE — KIT SPEAR TRCR OBT PNCH 3.9MM

## (undated) DEVICE — TAPE BANDAGE TIGER 7 IN

## (undated) DEVICE — SOL ALCOHOL ISO 70% WNTGR 16OZ

## (undated) DEVICE — KIT FIBERTAK DISP ANCHOR 2.6

## (undated) DEVICE — BLADE RESECT SHAVER F 3.5MM

## (undated) DEVICE — DRESSING TRANS 4X4 TEGADERM